# Patient Record
(demographics unavailable — no encounter records)

---

## 2024-10-25 NOTE — REASON FOR VISIT
[Follow-Up] : a follow-up visit  [Parent] : parent [FreeTextEntry3] : OLTx  [FreeTextEntry5] : 2/14/2024

## 2024-10-25 NOTE — PLAN
[FreeTextEntry1] : 47M PMHx HTN, essential thrombocytosis, transferred to [facility] from [facility] with decompensated EtOH cirrhosis for liver transplant evaluation. Underwent  donor orthotopic liver transplant on 24.   Readmitted 3/5-20- for VRE Bacteremia, bile leak s/p stent placement, perihepatic collection s/p drain placement removed on .   # GRAFT:  recent labs done in 2024 noted with elevated liver enzymes due to biliary stricture.  ERCP done on 2024 with Dr. Peters for post OLT biliary stricture. LFTs improved after procedure.  Had repeat ERCP in 10/22/2024 due to stent migration, now with 2 plastic stents placed.  Repeat labs in 1 week home draw.  Repeat ERCP in a few months   # Immuno: Fk Level 6.7 on tacro 0.5 BID (Mon, Wed, ), Tacro 0.5 QD (Tues, Thursday). Special dosing given low metabolism to FK vs biliary stricture.  mg BID. Patient is compliant with medications.  Will adjust tacro dosage based on repeat labs next week.   # PPX: Valcyte (high risk) noted low viremia will continue for now ok to DC if negative on next set of labs. Completed Bactrim for 6 mos, PPI. DC Keppra for seizure pphx in 2024.   #Thrombocytopenia: PLT 70--> possibly related to residual splenomegaly.   # CBD Stent: Continue ursodial. Last ERCP in 10/22/2024 due to stent migration, now with 2 plastic stents placed. Due for repeat ERCP with Dr. Peters in 3 months 2025.   #Left elbow pain-- most likely related to hx bone spur at the joint. He had films done with outside ortho in the past. Scheduled for outpatient procedure in 2 weeks spoke to ortho office to hold off on the procedure given recent elevated liver enzymes will evaluate this first to r/o biliary obstruction vs ACR.   #RPP - Never completed RPP intake. Continue to deny ETOH cravings. Understands that he cannot drink any alcohol to protect the liver. Refused medications to prevent cravings. Peth has been negative since OLT. Continue to PETH periodically. Social work to follow.   #Hypomagnesemia - Currently on supplemental Mg 800mg BID. Poor PO intake at baseline. Encouraged to incorporate foods high in magnesium to diet.   #General myalgia/neuropathy post transplant-- Cont gabapentin 300mg BID. cont PT weekly.  #essential tremor-- continue on propranolol 10mg twice daily. f/u with outside neurologist.   #Shx: currently unemployed. 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    #Health maintenance Colonoscopy--CF done in 2024 non bleeding hemorrhoids and rectal varices. no polyp. next due in 7-10 years.  Chest CT (smoking hx) --done in 3/2024 unremarkable.  DEXA--will obtain after one year of transplant.  Skin check--done with outside dermatologist Maria E Tatum, DO in 2024 next due in one year.  Vaccination-- Flu received in 2024 UTD  / COVID  received in 2024 UTD   / PCV will obtain in [facility] in 1 month [place]. Repeat Home draw lab in 1 weeks.

## 2024-10-25 NOTE — REVIEW OF SYSTEMS
[Abdominal Pain] : abdominal pain [Joint Pain] : joint pain [Negative] : Heme/Lymph [Fever] : no fever [Chills] : no chills [Fatigue] : no fatigue [Sclera anicteric] : sclera icteric [Double vision] : no double vision [Blurred Vision] : no blurred vision [Chest Pain] : no chest pain [Palpitations] : no palpitations [SOB] : no shortness of breath [Wheezing] : no wheezing [Cough] : no cough [Nausea] : no nausea [Constipation] : no constipation [Diarrhea] : diarrhea [Dysuria] : no dysuria [Frequency] : no frequency [Urgency] : no urinary urgency [Joint Stiffness] : no joint stiffness [Muscle Pain] : no muscle pain [FreeTextEntry9] : L elbow pain

## 2024-10-25 NOTE — PLAN
[FreeTextEntry1] : 47M PMHx HTN, essential thrombocytosis, transferred to [facility] from [facility] with decompensated EtOH cirrhosis for liver transplant evaluation. Underwent  donor orthotopic liver transplant on 24.   Readmitted 3/5-20- for VRE Bacteremia, bile leak s/p stent placement, perihepatic collection s/p drain placement removed on .   # GRAFT:  recent labs done in 2024 noted with elevated liver enzymes due to biliary stricture.  ERCP done on 2024 with Dr. Peters for post OLT biliary stricture. LFTs improved after procedure.  Had repeat ERCP in 10/22/2024 due to stent migration, now with 2 plastic stents placed.  Repeat labs in 1 week home draw.  Repeat ERCP in a few months   # Immuno: Fk Level 6.7 on tacro 0.5 BID (Mon, Wed, ), Tacro 0.5 QD (Tues, Thursday). Special dosing given low metabolism to FK vs biliary stricture.  mg BID. Patient is compliant with medications.  Will adjust tacro dosage based on repeat labs next week.   # PPX: Valcyte (high risk) noted low viremia will continue for now ok to DC if negative on next set of labs. Completed Bactrim for 6 mos, PPI. DC Keppra for seizure pphx in 2024.   #Thrombocytopenia: PLT 70--> possibly related to residual splenomegaly.   # CBD Stent: Continue ursodial. Last ERCP in 10/22/2024 due to stent migration, now with 2 plastic stents placed. Due for repeat ERCP with Dr. Peters in 3 months 2025.   #Left elbow pain-- most likely related to hx bone spur at the joint. He had films done with outside ortho in the past. Scheduled for outpatient procedure in 2 weeks spoke to ortho office to hold off on the procedure given recent elevated liver enzymes will evaluate this first to r/o biliary obstruction vs ACR.   #RPP - Never completed RPP intake. Continue to deny ETOH cravings. Understands that he cannot drink any alcohol to protect the liver. Refused medications to prevent cravings. Peth has been negative since OLT. Continue to PETH periodically. Social work to follow.   #Hypomagnesemia - Currently on supplemental Mg 800mg BID. Poor PO intake at baseline. Encouraged to incorporate foods high in magnesium to diet.   #General myalgia/neuropathy post transplant-- Cont gabapentin 300mg BID. cont PT weekly.  #essential tremor-- continue on propranolol 10mg twice daily. f/u with outside neurologist.   #Shx: currently unemployed. 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    #Health maintenance Colonoscopy--CF done in 2024 non bleeding hemorrhoids and rectal varices. no polyp. next due in 7-10 years.  Chest CT (smoking hx) --done in 3/2024 unremarkable.  DEXA--will obtain after one year of transplant.  Skin check--done with outside dermatologist Maria E Tatum, DO in 2024 next due in one year.  Vaccination-- Flu received in 2024 UTD  / COVID  received in 2024 UTD   / PCV will obtain in [facility] in 1 month [place]. Repeat Home draw lab in 1 weeks.

## 2024-10-25 NOTE — PHYSICAL EXAM
[Alert] : alert [No Acute Distress] : no acute distress [EOMI] : extra ocular movement intact [PERRLA] : pupils equal, round, reactive to light and accomodation [Full ROM] : full range of motion [No Lymphadenopathy] : no lymphadenopathy [Clear to Auscultation] : lungs were clear to auscultation bilaterally [Breathing Comfortably on room air] : breathing comfortably on room air [Normal Rate] : normal rate [Regular Rhythm] : regular rhythm [Soft] : soft [Normal Bowel Sounds] : normal bowel sounds [Clean] : clean [Dry] : dry [Healing Well] : healing well [No Edema] : no edema [No Skin Discoloration] : no skin discoloration [No Ulcers] : no ulcers [Strength in Tact] : strength in tact [No Rash] : no rash [Scleral Icterus] : no scleral icterus [Hepatojugular Reflux] : no hepatojugular reflux [Ascites Fluid Wave] : no ascites fluid wave [Abdominal Ascites] : no abdominal ascites [Ascites Tense] : no ascites tense [Bleeding] : no active bleeding [Foul Odor] : no foul smell [Purulent Drainage] : no purulent drainage [Serosanguinous Drainage] : no serosanguinous drainage [Erythema] : not erythematous [Warm] : not warm [Tender] : not tender [Spider Angioma] : no spider angioma [Jaundice] : no jaundice [Palmar Erythema] : no palmar erythema [Asterixis] : no asterixis [Hepatic Encephalopathy] : no hepatic encephalopathy [de-identified] : RLQ pain around the incision line. sensitive to touch. [de-identified] : dry/intact [FreeTextEntry1] : c/o tenderness Left elbow

## 2024-10-25 NOTE — HISTORY OF PRESENT ILLNESS
[Alcoholic Liver Disease] : Alcoholic Liver Disease [Liver] : Liver [Donor after brain death (DBD] : Donor after brain death (DBD) [Basiliximab] : Basiliximab [Steroids] : Steroids [Positive/Negative] : Positive/Negative [TextBox_52] : CHANTALOS ID: [id number] Match ID: [id number]  [FreeTextEntry1] : Adan Nagel is a 46 y/o M with past medical history significant for HTN, essential thrombocytosis and decompensated ETOH cirrhosis now s/p OLT 2/14/2024 with Simulect induction, uncomplicated post-operative course discharged home on 2/27/24,   Re-admitted  March 5-20 - presents to [facility] ED on 3/5/24 with fever, diarrhea, lethargy, found to be lethargic, hypotensive and febrile, concerning for septic shock.  Blood cultures from 3/5 & 3/7 grew vancomycin resistant Enterococcus faecium (VRE), blood cultures cleared since 3/9. - CT CAP noncon (3/5): perihepatic collection - CT AP (3/8): portacaval collection measuring 9.8 x 4.4 x 3.4 cm  - ERCP 3/8 with placement of covered metal stent, positive for bile leak.  - IR drainage of perihepatic collection with pigtail drain placement on 3/12, fluid culture positive for VRE and candida glabrata. (drain removed 4/29) - Treated with Caspofungin/Daptomycin/unasyn by transplant ID - PICC line placed on 3/18   EXPLANT Pathology:  - Established cirrhosis with steatohepatitis  - Gallbladder with minimal chronic inflammation and cholelithiasis - Two benign lymph nodes with hemosiderin - The overall findings are consistent with steatohepatitis with cirrhosis.  Interval Hx LOV 9/12/2024 8 months post-transplant.  Noted with elevated LFTs bili 1.2 AST 51 ALT 45 ALKP 152 in 9/16 2024.  Had two ERCPs done on 9/26/2024 with Dr. Peters for post OLT biliary stricture,. Had repeat ERCP in 10/22/2024 due to stent migration, now with 2 plastic stents placed. Right sided abd pain improved after the repeat ERCP.  Graft function remains stable with liver enzymes improving and mildly elevated Total bili at 1.4. Due for repeat labs after last ERCP.  Dx with chronic pancreatic insufficiency 2/2 alcohol use started on creon in 10/15/2024  f/u outside GI Dr. ORA NOLASCO.  Reports loose bowel movement has improved since started creon.  Has been compliant with the immunosuppressants.  Continue physical therapy near home. helps with torn left knee meniscus. Will continue f/u with ortho.  c/o RLQ pain along incision - retained suture in the stomach.  RPP-- have not complete intake with [facility] in [place]. but Peth has been negative.   Current Immuno:  Tacro level 6.7 (10/2024) on tacro 0.5 BID (Mon, Wed, Friday Sunday), Tacro 0.5 QD (Tues, Thursday).   MMF 500mg BID  PPX: Valcyte, Bactrim, PPI, Mag

## 2024-10-25 NOTE — HISTORY OF PRESENT ILLNESS
[Alcoholic Liver Disease] : Alcoholic Liver Disease [Liver] : Liver [Donor after brain death (DBD] : Donor after brain death (DBD) [Basiliximab] : Basiliximab [Steroids] : Steroids [Positive/Negative] : Positive/Negative [TextBox_52] : CHANTALOS ID: [id number] Match ID: [id number]  [FreeTextEntry1] : Adan Nagel is a 48 y/o M with past medical history significant for HTN, essential thrombocytosis and decompensated ETOH cirrhosis now s/p OLT 2/14/2024 with Simulect induction, uncomplicated post-operative course discharged home on 2/27/24,   Re-admitted  March 5-20 - presents to [facility] ED on 3/5/24 with fever, diarrhea, lethargy, found to be lethargic, hypotensive and febrile, concerning for septic shock.  Blood cultures from 3/5 & 3/7 grew vancomycin resistant Enterococcus faecium (VRE), blood cultures cleared since 3/9. - CT CAP noncon (3/5): perihepatic collection - CT AP (3/8): portacaval collection measuring 9.8 x 4.4 x 3.4 cm  - ERCP 3/8 with placement of covered metal stent, positive for bile leak.  - IR drainage of perihepatic collection with pigtail drain placement on 3/12, fluid culture positive for VRE and candida glabrata. (drain removed 4/29) - Treated with Caspofungin/Daptomycin/unasyn by transplant ID - PICC line placed on 3/18   EXPLANT Pathology:  - Established cirrhosis with steatohepatitis  - Gallbladder with minimal chronic inflammation and cholelithiasis - Two benign lymph nodes with hemosiderin - The overall findings are consistent with steatohepatitis with cirrhosis.  Interval Hx LOV 9/12/2024 8 months post-transplant.  Noted with elevated LFTs bili 1.2 AST 51 ALT 45 ALKP 152 in 9/16 2024.  Had two ERCPs done on 9/26/2024 with Dr. Peters for post OLT biliary stricture,. Had repeat ERCP in 10/22/2024 due to stent migration, now with 2 plastic stents placed. Right sided abd pain improved after the repeat ERCP.  Graft function remains stable with liver enzymes improving and mildly elevated Total bili at 1.4. Due for repeat labs after last ERCP.  Dx with chronic pancreatic insufficiency 2/2 alcohol use started on creon in 10/15/2024  f/u outside GI Dr. ORA NOLASCO.  Reports loose bowel movement has improved since started creon.  Has been compliant with the immunosuppressants.  Continue physical therapy near home. helps with torn left knee meniscus. Will continue f/u with ortho.  c/o RLQ pain along incision - retained suture in the stomach.  RPP-- have not complete intake with [employer] in [place]. but Peth has been negative.   Current Immuno:  Tacro level 6.7 (10/2024) on tacro 0.5 BID (Mon, Wed, Friday Sunday), Tacro 0.5 QD (Tues, Thursday).   MMF 500mg BID  PPX: Valcyte, Bactrim, PPI, Mag

## 2024-10-30 NOTE — PHYSICAL EXAM
[Alert] : alert [No Acute Distress] : no acute distress [EOMI] : extra ocular movement intact [PERRLA] : pupils equal, round, reactive to light and accomodation [Full ROM] : full range of motion [No Lymphadenopathy] : no lymphadenopathy [Clear to Auscultation] : lungs were clear to auscultation bilaterally [Breathing Comfortably on room air] : breathing comfortably on room air [Normal Rate] : normal rate [Regular Rhythm] : regular rhythm [Soft] : soft [Normal Bowel Sounds] : normal bowel sounds [Clean] : clean [Dry] : dry [Healing Well] : healing well [No Edema] : no edema [No Skin Discoloration] : no skin discoloration [No Ulcers] : no ulcers [Strength in Tact] : strength in tact [No Rash] : no rash [Scleral Icterus] : no scleral icterus [Hepatojugular Reflux] : no hepatojugular reflux [Ascites Fluid Wave] : no ascites fluid wave [Abdominal Ascites] : no abdominal ascites [Ascites Tense] : no ascites tense [Bleeding] : no active bleeding [Foul Odor] : no foul smell [Purulent Drainage] : no purulent drainage [Serosanguinous Drainage] : no serosanguinous drainage [Erythema] : not erythematous [Warm] : not warm [Tender] : not tender [Spider Angioma] : no spider angioma [Jaundice] : no jaundice [Palmar Erythema] : no palmar erythema [Asterixis] : no asterixis [Hepatic Encephalopathy] : no hepatic encephalopathy [de-identified] : RLQ pain around the incision line. sensitive to touch. [de-identified] : dry/intact [FreeTextEntry1] : c/o tenderness Left elbow

## 2024-10-30 NOTE — PLAN
[FreeTextEntry1] : 47M PMHx HTN, essential thrombocytosis, transferred to [facility] from [facility] with decompensated EtOH cirrhosis for liver transplant evaluation. Underwent  donor orthotopic liver transplant on 24.   Readmitted 3/5-20- for VRE Bacteremia, bile leak s/p stent placement, perihepatic collection s/p drain placement removed on .   # GRAFT: recent labs done in 2024 noted with elevated liver enzymes AST 50 ALT 50 ALKP 141 TB 1.3. creatinine 0.79 Possibly related to biliary issues with elevated Tbili and given recent biliary stent removal.  Will need urgent MRCP and possibly ERCP for further evualation.  Consider liver biopsy if negative MRCP or persisting elevated liver enzymes.  stable H/H. PLT 70--> possibly related to residual splenomegaly.  Repeat labs in 1 week.  Discussed if noted alarming sx such as fevers, nausea, vomiting, jaundice, abdominal pain, itchiness need to go to [facility] ED ASAP.   # Immuno: Fk Level 6.9 on tacro 0.5 BID (Mon, Wed, ), Tacro 0.5 QD (Tues, Thursday). Special dosing given low metabolism to FK.  mg BID. Patient is compliant with medications.   # PPX: Valcyte (high risk) noted low viremia will continue for now, Bactrim (Okay to stop at 6 months tom), PPI. Stop Keppra for seizure pphx today (no hx of seizures).   # CBD Stent  ERCP w/ stent placement done during hospitalization 3/8, Repeat ERCP done on 2024 then in 2024 with stent removal by Dr. Rocha. No stents in place. continue on ursodial.   #Left elbow pain-- most likely related to hx bone spur at the joint. He had films done with outside ortho in the past. Scheduled for outpatient procedure in 2 weeks spoke to ortho office to hold off on the procedure given recent elevated liver enzymes will evaluate this first to r/o biliary obstruction vs ACR.   #RPP - patient states that he might have had the intake done with [facility] few months ago at [facility]. No further visits done. Continue to deny ETOH cravings. Understands that he cannot drink any alcohol to protect the liver. Discussed to reconnect with [facility] at [place] location today after the visit. Would try to have patient on PO Naltrexone if patient willing. Continue to PETH periodically. Social work to follow.   #Hypomagnesemia - Currently on supplemental Mg 800mg BID. Poor PO intake at baseline. Encouraged to incorporate foods high in magnesium to diet.   #Leukopenia - improved.   #General myalgia/neuropathy post transplant-- Cont gabapentin 300mg BID. cont PT weekly.  #essential tremor-- continue on propranolol 10mg twice daily. f/u with outside neurologist.    #Health maintenance EGD-- Colonoscopy-- DEXA-- Skin check--2024 with Maria E Tatum, DO Vaccination-- Flu Duane Reade UTD / COVID UTD / PCV 20 need with pharmacy.   RPA in 6 weeks [place]. Repeat Home draw lab in 1 weeks.

## 2024-10-30 NOTE — HISTORY OF PRESENT ILLNESS
[Alcoholic Liver Disease] : Alcoholic Liver Disease [Liver] : Liver [Donor after brain death (DBD] : Donor after brain death (DBD) [Basiliximab] : Basiliximab [Steroids] : Steroids [Positive/Negative] : Positive/Negative [TextBox_52] : CHANTALOS ID: [id number] Match ID: [id number]  [FreeTextEntry1] : Adan Nagel is a 46 y/o M with past medical history significant for HTN, essential thrombocytosis and decompensated ETOH cirrhosis now s/p OLT 2/14/2024 with Simulect induction, uncomplicated post-operative course discharged home on 2/27/24,   Re-admitted  March 5-20 - presents to [facility] ED on 3/5/24 with fever, diarrhea, lethargy, found to be lethargic, hypotensive and febrile, concerning for septic shock.  Blood cultures from 3/5 & 3/7 grew vancomycin resistant Enterococcus faecium (VRE), blood cultures cleared since 3/9. - CT CAP noncon (3/5): perihepatic collection - CT AP (3/8): portacaval collection measuring 9.8 x 4.4 x 3.4 cm  - ERCP 3/8 with placement of covered metal stent, positive for bile leak.  - IR drainage of perihepatic collection with pigtail drain placement on 3/12, fluid culture positive for VRE and candida glabrata. (drain removed 4/29) - Treated with Caspofungin/Daptomycin/unasyn by transplant ID - PICC line placed on 3/18   EXPLANT Pathology:  - Established cirrhosis with steatohepatitis  - Gallbladder with minimal chronic inflammation and cholelithiasis - Two benign lymph nodes with hemosiderin - The overall findings are consistent with steatohepatitis with cirrhosis.  Interval Hx LOV 6/13/2024 7 months post-transplant.  Had ERCP done with Dr. Rocha on 7/29/2024 metal biliary stents had spontaneously migrated out of the bile duct, anastamotic biliary stricture improved. No stents replaced.   Had COVID infection two weeks ago. Mild sx with coughing no fevers. did not take Plaxlovid. sx resolved now.  reviewed most recent labs done on 9/2024 noted elevated liver enzymes ALT50 / AST 50 // TB 1.3 Normal renal function. Mg still slightly lower on high dose magnesium.  Has been compliant with the immunosuppressants did missed a doses a week ago.   Continue physical therapy near home. doing well. helps with the myalgia.  c/o RLQ pain along incision - retained suture in the stomach.  RPP-- have not complete intake with [employer] in [place]. but Peth has been negative.  Patient is planned for left elbow bone spur removal with orthopedic in 2 weeks.  Torn left meniscus a week ago unclear how but noted swollen left knee with bruises before PT had MRI done. Was told to be mild tear currently bracing and may not need any invasive treatment.   Current Immuno:  Tacro level 6.9 (7/26/2024) on tacro 0.5 BID (Mon, Wed, Friday Sunday), Tacro 0.5 QD (Tues, Thursday).   MMF 500mg BID   PPX: Valcyte, Bactrim, PPI, Mag  ERCP done in 10/22/2024 Impression: - Interval migration of previously placed [id number] FCSEMS, not visualized. - Anastomotic stricture again seen, dilated to 6mm and two plastic [id number] straight stents placed. Recommendation: - Repeat ERCP for stent exchange (will try 3 stents) in 3 months  Coughing 1.5 week productive only occurs sporadically.  Taking creon 3 times a day with or without meals.

## 2024-10-30 NOTE — PHYSICAL EXAM
[Alert] : alert [No Acute Distress] : no acute distress [EOMI] : extra ocular movement intact [PERRLA] : pupils equal, round, reactive to light and accomodation [Full ROM] : full range of motion [No Lymphadenopathy] : no lymphadenopathy [Clear to Auscultation] : lungs were clear to auscultation bilaterally [Breathing Comfortably on room air] : breathing comfortably on room air [Normal Rate] : normal rate [Regular Rhythm] : regular rhythm [Soft] : soft [Normal Bowel Sounds] : normal bowel sounds [Clean] : clean [Dry] : dry [Healing Well] : healing well [No Edema] : no edema [No Skin Discoloration] : no skin discoloration [No Ulcers] : no ulcers [Strength in Tact] : strength in tact [No Rash] : no rash [Scleral Icterus] : no scleral icterus [Hepatojugular Reflux] : no hepatojugular reflux [Ascites Fluid Wave] : no ascites fluid wave [Abdominal Ascites] : no abdominal ascites [Ascites Tense] : no ascites tense [Bleeding] : no active bleeding [Foul Odor] : no foul smell [Purulent Drainage] : no purulent drainage [Serosanguinous Drainage] : no serosanguinous drainage [Erythema] : not erythematous [Warm] : not warm [Tender] : not tender [Spider Angioma] : no spider angioma [Jaundice] : no jaundice [Palmar Erythema] : no palmar erythema [Asterixis] : no asterixis [Hepatic Encephalopathy] : no hepatic encephalopathy [de-identified] : RLQ pain around the incision line. sensitive to touch. [de-identified] : dry/intact [FreeTextEntry1] : c/o tenderness Left elbow

## 2024-10-30 NOTE — HISTORY OF PRESENT ILLNESS
[Alcoholic Liver Disease] : Alcoholic Liver Disease [Liver] : Liver [Donor after brain death (DBD] : Donor after brain death (DBD) [Basiliximab] : Basiliximab [Steroids] : Steroids [Positive/Negative] : Positive/Negative [TextBox_52] : CHANTALOS ID: [id number] Match ID: [id number]  [FreeTextEntry1] : Adan Nagel is a 46 y/o M with past medical history significant for HTN, essential thrombocytosis and decompensated ETOH cirrhosis now s/p OLT 2/14/2024 with Simulect induction, uncomplicated post-operative course discharged home on 2/27/24,   Re-admitted  March 5-20 - presents to [facility] ED on 3/5/24 with fever, diarrhea, lethargy, found to be lethargic, hypotensive and febrile, concerning for septic shock.  Blood cultures from 3/5 & 3/7 grew vancomycin resistant Enterococcus faecium (VRE), blood cultures cleared since 3/9. - CT CAP noncon (3/5): perihepatic collection - CT AP (3/8): portacaval collection measuring 9.8 x 4.4 x 3.4 cm  - ERCP 3/8 with placement of covered metal stent, positive for bile leak.  - IR drainage of perihepatic collection with pigtail drain placement on 3/12, fluid culture positive for VRE and candida glabrata. (drain removed 4/29) - Treated with Caspofungin/Daptomycin/unasyn by transplant ID - PICC line placed on 3/18   EXPLANT Pathology:  - Established cirrhosis with steatohepatitis  - Gallbladder with minimal chronic inflammation and cholelithiasis - Two benign lymph nodes with hemosiderin - The overall findings are consistent with steatohepatitis with cirrhosis.  Interval Hx LOV 6/13/2024 7 months post-transplant.  Had ERCP done with Dr. Rocha on 7/29/2024 metal biliary stents had spontaneously migrated out of the bile duct, anastamotic biliary stricture improved. No stents replaced.   Had COVID infection two weeks ago. Mild sx with coughing no fevers. did not take Plaxlovid. sx resolved now.  reviewed most recent labs done on 9/2024 noted elevated liver enzymes ALT50 / AST 50 // TB 1.3 Normal renal function. Mg still slightly lower on high dose magnesium.  Has been compliant with the immunosuppressants did missed a doses a week ago.   Continue physical therapy near home. doing well. helps with the myalgia.  c/o RLQ pain along incision - retained suture in the stomach.  RPP-- have not complete intake with [facility] [facility] in [place]. but Peth has been negative.  Patient is planned for left elbow bone spur removal with orthopedic in 2 weeks.  Torn left meniscus a week ago unclear how but noted swollen left knee with bruises before PT had MRI done. Was told to be mild tear currently bracing and may not need any invasive treatment.   Current Immuno:  Tacro level 6.9 (7/26/2024) on tacro 0.5 BID (Mon, Wed, Friday Sunday), Tacro 0.5 QD (Tues, Thursday).   MMF 500mg BID   PPX: Valcyte, Bactrim, PPI, Mag  ERCP done in 10/22/2024 Impression: - Interval migration of previously placed [id number] FCSEMS, not visualized. - Anastomotic stricture again seen, dilated to 6mm and two plastic [id number] straight stents placed. Recommendation: - Repeat ERCP for stent exchange (will try 3 stents) in 3 months  Coughing 1.5 week productive only occurs sporadically.  Taking creon 3 times a day with or without meals.

## 2024-11-22 NOTE — REVIEW OF SYSTEMS
[Fever] : no fever [Chills] : no chills [Fatigue] : no fatigue [Sclera anicteric] : sclera icteric [Double vision] : no double vision [Blurred Vision] : no blurred vision [Chest Pain] : no chest pain [Palpitations] : no palpitations [SOB] : no shortness of breath [Wheezing] : no wheezing [Cough] : no cough [Nausea] : no nausea [Constipation] : no constipation [Diarrhea] : diarrhea [Dysuria] : no dysuria [Frequency] : no frequency [Urgency] : no urinary urgency [Joint Stiffness] : no joint stiffness [Muscle Pain] : no muscle pain [FreeTextEntry9] : L elbow pain

## 2024-11-22 NOTE — PHYSICAL EXAM
[Scleral Icterus] : no scleral icterus [Hepatojugular Reflux] : no hepatojugular reflux [Ascites Fluid Wave] : no ascites fluid wave [Ascites Tense] : no ascites tense [Abdominal Ascites] : no abdominal ascites [Bleeding] : no active bleeding [Foul Odor] : no foul smell [Purulent Drainage] : no purulent drainage [Serosanguinous Drainage] : no serosanguinous drainage [Erythema] : not erythematous [Warm] : not warm [Tender] : not tender [Spider Angioma] : no spider angioma [Jaundice] : no jaundice [Palmar Erythema] : no palmar erythema [Asterixis] : no asterixis [Hepatic Encephalopathy] : no hepatic encephalopathy [de-identified] : dry/intact [de-identified] : RLQ pain around the incision line. sensitive to touch. [FreeTextEntry1] : c/o tenderness Left elbow

## 2024-11-22 NOTE — PLAN
[FreeTextEntry1] : 47M PMHx HTN, essential thrombocytosis, transferred to [facility] from [facility] with decompensated EtOH cirrhosis for liver transplant evaluation. Underwent  donor orthotopic liver transplant on 24.   Readmitted 3/5-20- for VRE Bacteremia, bile leak s/p stent placement, perihepatic collection s/p drain placement removed on .   # GRAFT:  recent labs done in 2024 noted with elevated liver enzymes due to biliary stricture.  ERCP done on 2024 with Dr. Peters for post OLT biliary stricture. LFTs improved after procedure.  Had repeat ERCP in 10/22/2024 due to stent migration, now with 2 plastic stents placed.  Labs improved post ERCP TB down to 1.0 liver enzymes wnl.  Repeat ERCP scheduled for 2024.  # Immuno: Fk Level 6.0 on tacro 0.5 BID (Mon, Wed, ), Tacro 0.5 QD (Tues, Thursday). Special dosing given low metabolism to FK vs biliary stricture. Myfortic 360 BID. Patient is compliant with medications.    # PPX: Valcyte (high risk) noted low viremia . Will DC Valcyte today. continue CMV PCR check monthly. ( Completed Bactrim for 6 mos, PPI. DC Keppra for seizure pphx in 2024).   #Thrombocytopenia: PLT 70-->  residual splenomegaly.   # CBD Stent: Continue ursodial. Last ERCP in 10/22/2024 due to stent migration, now with 2 plastic stents placed. Due for repeat ERCP with Dr. Peters in 3 months 2025.   #Left elbow pain-- most likely related to hx bone spur at the joint. He had films done with outside ortho in the past. Scheduled for outpatient procedure in 2 weeks spoke to ortho office to hold off on the procedure. Will defer any elective procedure until post OLT one year.   #RPP - Never completed RPP intake. Continue to deny ETOH cravings. Understands that he cannot drink any alcohol to protect the liver. Refused medications to prevent cravings. Peth has been negative since OLT. Continue to PETH periodically. Social work to follow.   #Hypomagnesemia - Currently on supplemental Mg 800mg BID. Poor PO intake at baseline. Encouraged to incorporate foods high in magnesium to diet.   #General myalgia/neuropathy post transplant-- Cont gabapentin 300mg BID. cont PT weekly.  #essential tremor-- continue on propranolol 10mg twice daily. f/u with outside neurologist.   #Shx: currently unemployed. actively applying for jobs as an  or IT. Discussed to wear abd binder during physically demanding work.   #Health maintenance Colonoscopy--CF done in 2024 non bleeding hemorrhoids and rectal varices. no polyp. next due in 7-10 years.  Chest CT (smoking hx) --done in 3/2024 unremarkable.  DEXA--will obtain after one year of transplant.  Skin check--done with outside dermatologist Maria E Tatum, DO in 2024 next due in one year.  Vaccination-- Flu received in 2024 UTD  / COVID  received in 2024 UTD   / PCV will obtain in [facility] in 2 month [place]. Repeat Home draw lab in 1 month.

## 2024-11-22 NOTE — PHYSICAL EXAM
[Scleral Icterus] : no scleral icterus [Hepatojugular Reflux] : no hepatojugular reflux [Ascites Fluid Wave] : no ascites fluid wave [Abdominal Ascites] : no abdominal ascites [Ascites Tense] : no ascites tense [Bleeding] : no active bleeding [Foul Odor] : no foul smell [Purulent Drainage] : no purulent drainage [Serosanguinous Drainage] : no serosanguinous drainage [Erythema] : not erythematous [Warm] : not warm [Tender] : not tender [Spider Angioma] : no spider angioma [Jaundice] : no jaundice [Palmar Erythema] : no palmar erythema [Asterixis] : no asterixis [Hepatic Encephalopathy] : no hepatic encephalopathy [de-identified] : RLQ pain around the incision line. sensitive to touch. [de-identified] : dry/intact [FreeTextEntry1] : c/o tenderness Left elbow

## 2024-11-22 NOTE — HISTORY OF PRESENT ILLNESS
[TextBox_52] : CHANTALOS ID: [id number] Match ID: [id number]  [FreeTextEntry1] : Adan Nagel is a 48 y/o M with past medical history significant for HTN, essential thrombocytosis and decompensated ETOH cirrhosis now s/p OLT 2/14/2024 with Simulect induction, uncomplicated post-operative course discharged home on 2/27/24,   Re-admitted  March 5-20 - presents to [facility] ED on 3/5/24 with fever, diarrhea, lethargy, found to be lethargic, hypotensive and febrile, concerning for septic shock.  Blood cultures from 3/5 & 3/7 grew vancomycin resistant Enterococcus faecium (VRE), blood cultures cleared since 3/9. - CT CAP noncon (3/5): perihepatic collection - CT AP (3/8): portacaval collection measuring 9.8 x 4.4 x 3.4 cm  - ERCP 3/8 with placement of covered metal stent, positive for bile leak.  - IR drainage of perihepatic collection with pigtail drain placement on 3/12, fluid culture positive for VRE and candida glabrata. (drain removed 4/29) - Treated with Caspofungin/Daptomycin/unasyn by transplant ID - PICC line placed on 3/18   EXPLANT Pathology:  - Established cirrhosis with steatohepatitis  - Gallbladder with minimal chronic inflammation and cholelithiasis - Two benign lymph nodes with hemosiderin - The overall findings are consistent with steatohepatitis with cirrhosis.  9/17/2024 Noted with elevated LFTs bili 1.2 AST 51 ALT 45 ALKP 152 Had two ERCPs done on 9/26/2024 with Dr. Peters for post OLT biliary stricture,. Had repeat ERCP in 10/22/2024 due to stent migration, now with 2 plastic stents placed. Right sided abd pain improved after the repeat ERCP.   Interval Hx LOV 10/24/2024 9 months post-transplant.  Doing well. Reviewed most recent labs LFTs improving. TB down to 1.0.  Still having on and off nausea and vomiting with diarrhea. Seen GI Dr. Zendejas started on cholestyramine and stopped creaon. So far sx has been in control for the past 3 days. Has been compliant with the immunosuppressants.  Continue physical therapy near home. helps with torn left knee meniscus.  c/o RLQ pain along incision - retained suture in the stomach.  RPP-- have not complete intake with [facility] in [place]. but Peth has been negative.   Current Immuno:  Tacro level 6.0 (10/2024) on tacro 0.5 BID (Mon, Wed, Friday Sunday), Tacro 0.5 QD (Tues, Thursday).   Mycophenolate 360mg BID  PPX: Valcyte, Bactrim, PPI, Mag

## 2024-11-22 NOTE — HISTORY OF PRESENT ILLNESS
[TextBox_52] : CHANTALOS ID: [id number] Match ID: [id number]  [FreeTextEntry1] : Adan Nagel is a 48 y/o M with past medical history significant for HTN, essential thrombocytosis and decompensated ETOH cirrhosis now s/p OLT 2/14/2024 with Simulect induction, uncomplicated post-operative course discharged home on 2/27/24,   Re-admitted  March 5-20 - presents to [facility] ED on 3/5/24 with fever, diarrhea, lethargy, found to be lethargic, hypotensive and febrile, concerning for septic shock.  Blood cultures from 3/5 & 3/7 grew vancomycin resistant Enterococcus faecium (VRE), blood cultures cleared since 3/9. - CT CAP noncon (3/5): perihepatic collection - CT AP (3/8): portacaval collection measuring 9.8 x 4.4 x 3.4 cm  - ERCP 3/8 with placement of covered metal stent, positive for bile leak.  - IR drainage of perihepatic collection with pigtail drain placement on 3/12, fluid culture positive for VRE and candida glabrata. (drain removed 4/29) - Treated with Caspofungin/Daptomycin/unasyn by transplant ID - PICC line placed on 3/18   EXPLANT Pathology:  - Established cirrhosis with steatohepatitis  - Gallbladder with minimal chronic inflammation and cholelithiasis - Two benign lymph nodes with hemosiderin - The overall findings are consistent with steatohepatitis with cirrhosis.  9/17/2024 Noted with elevated LFTs bili 1.2 AST 51 ALT 45 ALKP 152 Had two ERCPs done on 9/26/2024 with Dr. Peters for post OLT biliary stricture,. Had repeat ERCP in 10/22/2024 due to stent migration, now with 2 plastic stents placed. Right sided abd pain improved after the repeat ERCP.   Interval Hx LOV 10/24/2024 9 months post-transplant.  Doing well. Reviewed most recent labs LFTs improving. TB down to 1.0.  Still having on and off nausea and vomiting with diarrhea. Seen GI Dr. Zendejas started on cholestyramine and stopped creaon. So far sx has been in control for the past 3 days. Has been compliant with the immunosuppressants.  Continue physical therapy near home. helps with torn left knee meniscus.  c/o RLQ pain along incision - retained suture in the stomach.  RPP-- have not complete intake with [facility] in [place]. but Peth has been negative.   Current Immuno:  Tacro level 6.0 (10/2024) on tacro 0.5 BID (Mon, Wed, Friday Sunday), Tacro 0.5 QD (Tues, Thursday).   Mycophenolate 360mg BID  PPX: Valcyte, Bactrim, PPI, Mag

## 2024-11-22 NOTE — PLAN
[FreeTextEntry1] : 47M PMHx HTN, essential thrombocytosis, transferred to [facility] from [facility] with decompensated EtOH cirrhosis for liver transplant evaluation. Underwent  donor orthotopic liver transplant on 24.   Readmitted 3/5-20- for VRE Bacteremia, bile leak s/p stent placement, perihepatic collection s/p drain placement removed on .   # GRAFT:  recent labs done in 2024 noted with elevated liver enzymes due to biliary stricture.  ERCP done on 2024 with Dr. Peters for post OLT biliary stricture. LFTs improved after procedure.  Had repeat ERCP in 10/22/2024 due to stent migration, now with 2 plastic stents placed.  Labs improved post ERCP TB down to 1.0 liver enzymes wnl.  Repeat ERCP scheduled for 2024.  # Immuno: Fk Level 6.0 on tacro 0.5 BID (Mon, Wed, ), Tacro 0.5 QD (Tues, Thursday). Special dosing given low metabolism to FK vs biliary stricture. Myfortic 360 BID. Patient is compliant with medications.    # PPX: Valcyte (high risk) noted low viremia . Will DC Valcyte today. continue CMV PCR check monthly. ( Completed Bactrim for 6 mos, PPI. DC Keppra for seizure pphx in 2024).   #Thrombocytopenia: PLT 70-->  residual splenomegaly.   # CBD Stent: Continue ursodial. Last ERCP in 10/22/2024 due to stent migration, now with 2 plastic stents placed. Due for repeat ERCP with Dr. Peters in 3 months 2025.   #Left elbow pain-- most likely related to hx bone spur at the joint. He had films done with outside ortho in the past. Scheduled for outpatient procedure in 2 weeks spoke to ortho office to hold off on the procedure. Will defer any elective procedure until post OLT one year.   #RPP - Never completed RPP intake. Continue to deny ETOH cravings. Understands that he cannot drink any alcohol to protect the liver. Refused medications to prevent cravings. Peth has been negative since OLT. Continue to PETH periodically. Social work to follow.   #Hypomagnesemia - Currently on supplemental Mg 800mg BID. Poor PO intake at baseline. Encouraged to incorporate foods high in magnesium to diet.   #General myalgia/neuropathy post transplant-- Cont gabapentin 300mg BID. cont PT weekly.  #essential tremor-- continue on propranolol 10mg twice daily. f/u with outside neurologist.   #Shx: currently unemployed. actively applying for jobs as an  or IT. Discussed to wear abd binder during physically demanding work.   #Health maintenance Colonoscopy--CF done in 2024 non bleeding hemorrhoids and rectal varices. no polyp. next due in 7-10 years.  Chest CT (smoking hx) --done in 3/2024 unremarkable.  DEXA--will obtain after one year of transplant.  Skin check--done with outside dermatologist Maria E Tatum, DO in 2024 next due in one year.  Vaccination-- Flu received in 2024 UTD  / COVID  received in 2024 UTD   / PCV will obtain in [facility] in 2 month [place]. Repeat Home draw lab in 1 month.

## 2024-11-22 NOTE — REVIEW OF SYSTEMS
[Fever] : no fever [Chills] : no chills [Fatigue] : no fatigue [Sclera anicteric] : sclera icteric [Double vision] : no double vision [Chest Pain] : no chest pain [Blurred Vision] : no blurred vision [Palpitations] : no palpitations [SOB] : no shortness of breath [Wheezing] : no wheezing [Cough] : no cough [Nausea] : no nausea [Constipation] : no constipation [Diarrhea] : diarrhea [Dysuria] : no dysuria [Frequency] : no frequency [Urgency] : no urinary urgency [Joint Stiffness] : no joint stiffness [Muscle Pain] : no muscle pain [FreeTextEntry9] : L elbow pain

## 2024-11-22 NOTE — HISTORY OF PRESENT ILLNESS
[TextBox_52] : CHANTALOS ID: [id number] Match ID: [id number]  [FreeTextEntry1] : Adan Nagel is a 48 y/o M with past medical history significant for HTN, essential thrombocytosis and decompensated ETOH cirrhosis now s/p OLT 2/14/2024 with Simulect induction, uncomplicated post-operative course discharged home on 2/27/24,   Re-admitted  March 5-20 - presents to [facility] ED on 3/5/24 with fever, diarrhea, lethargy, found to be lethargic, hypotensive and febrile, concerning for septic shock.  Blood cultures from 3/5 & 3/7 grew vancomycin resistant Enterococcus faecium (VRE), blood cultures cleared since 3/9. - CT CAP noncon (3/5): perihepatic collection - CT AP (3/8): portacaval collection measuring 9.8 x 4.4 x 3.4 cm  - ERCP 3/8 with placement of covered metal stent, positive for bile leak.  - IR drainage of perihepatic collection with pigtail drain placement on 3/12, fluid culture positive for VRE and candida glabrata. (drain removed 4/29) - Treated with Caspofungin/Daptomycin/unasyn by transplant ID - PICC line placed on 3/18   EXPLANT Pathology:  - Established cirrhosis with steatohepatitis  - Gallbladder with minimal chronic inflammation and cholelithiasis - Two benign lymph nodes with hemosiderin - The overall findings are consistent with steatohepatitis with cirrhosis.  9/17/2024 Noted with elevated LFTs bili 1.2 AST 51 ALT 45 ALKP 152 Had two ERCPs done on 9/26/2024 with Dr. Peters for post OLT biliary stricture,. Had repeat ERCP in 10/22/2024 due to stent migration, now with 2 plastic stents placed. Right sided abd pain improved after the repeat ERCP.   Interval Hx LOV 10/24/2024 9 months post-transplant.  Doing well. Reviewed most recent labs LFTs improving. TB down to 1.0.  Still having on and off nausea and vomiting with diarrhea. Seen GI Dr. Zendejas started on cholestyramine and stopped creaon. So far sx has been in control for the past 3 days. Has been compliant with the immunosuppressants.  Continue physical therapy near home. helps with torn left knee meniscus.  c/o RLQ pain along incision - retained suture in the stomach.  RPP-- have not complete intake with [facility] in [place]. but Peth has been negative.   Current Immuno:  Tacro level 6.0 (10/2024) on tacro 0.5 BID (Mon, Wed, Friday Sunday), Tacro 0.5 QD (Tues, Thursday).   Mycophenolate 360mg BID  PPX: Valcyte, Bactrim, PPI, Mag

## 2024-11-22 NOTE — PLAN
[FreeTextEntry1] : 47M PMHx HTN, essential thrombocytosis, transferred to [facility] from [facility] with decompensated EtOH cirrhosis for liver transplant evaluation. Underwent  donor orthotopic liver transplant on 24.   Readmitted 3/5-20- for VRE Bacteremia, bile leak s/p stent placement, perihepatic collection s/p drain placement removed on .   # GRAFT:  recent labs done in 2024 noted with elevated liver enzymes due to biliary stricture.  ERCP done on 2024 with Dr. Peters for post OLT biliary stricture. LFTs improved after procedure.  Had repeat ERCP in 10/22/2024 due to stent migration, now with 2 plastic stents placed.  Labs improved post ERCP TB down to 1.0 liver enzymes wnl.  Repeat ERCP scheduled for 2024.  # Immuno: Fk Level 6.0 on tacro 0.5 BID (Mon, Wed, ), Tacro 0.5 QD (Tues, Thursday). Special dosing given low metabolism to FK vs biliary stricture. Myfortic 360 BID. Patient is compliant with medications.    # PPX: Valcyte (high risk) noted low viremia . Will DC Valcyte today. continue CMV PCR check monthly. ( Completed Bactrim for 6 mos, PPI. DC Keppra for seizure pphx in 2024).   #Thrombocytopenia: PLT 70-->  residual splenomegaly.   # CBD Stent: Continue ursodial. Last ERCP in 10/22/2024 due to stent migration, now with 2 plastic stents placed. Due for repeat ERCP with Dr. Peters in 3 months 2025.   #Left elbow pain-- most likely related to hx bone spur at the joint. He had films done with outside ortho in the past. Scheduled for outpatient procedure in 2 weeks spoke to ortho office to hold off on the procedure. Will defer any elective procedure until post OLT one year.   #RPP - Never completed RPP intake. Continue to deny ETOH cravings. Understands that he cannot drink any alcohol to protect the liver. Refused medications to prevent cravings. Peth has been negative since OLT. Continue to PETH periodically. Social work to follow.   #Hypomagnesemia - Currently on supplemental Mg 800mg BID. Poor PO intake at baseline. Encouraged to incorporate foods high in magnesium to diet.   #General myalgia/neuropathy post transplant-- Cont gabapentin 300mg BID. cont PT weekly.  #essential tremor-- continue on propranolol 10mg twice daily. f/u with outside neurologist.   #Shx: currently unemployed. actively applying for jobs as an  or IT. Discussed to wear abd binder during physically demanding work.   #Health maintenance Colonoscopy--CF done in 2024 non bleeding hemorrhoids and rectal varices. no polyp. next due in 7-10 years.  Chest CT (smoking hx) --done in 3/2024 unremarkable.  DEXA--will obtain after one year of transplant.  Skin check--done with outside dermatologist Maria E Tatum, DO in 2024 next due in one year.  Vaccination-- Flu received in 2024 UTD  / COVID  received in 2024 UTD   / PCV will obtain in [facility] in 2 month [place]. Repeat Home draw lab in 1 month.

## 2024-11-22 NOTE — PHYSICAL EXAM
[Scleral Icterus] : no scleral icterus [Hepatojugular Reflux] : no hepatojugular reflux [Ascites Fluid Wave] : no ascites fluid wave [Abdominal Ascites] : no abdominal ascites [Ascites Tense] : no ascites tense [Bleeding] : no active bleeding [Foul Odor] : no foul smell [Purulent Drainage] : no purulent drainage [Serosanguinous Drainage] : no serosanguinous drainage [Erythema] : not erythematous [Warm] : not warm [Tender] : not tender [Spider Angioma] : no spider angioma [Jaundice] : no jaundice [Palmar Erythema] : no palmar erythema [Asterixis] : no asterixis [Hepatic Encephalopathy] : no hepatic encephalopathy [de-identified] : RLQ pain around the incision line. sensitive to touch. [de-identified] : dry/intact [FreeTextEntry1] : c/o tenderness Left elbow

## 2024-11-22 NOTE — PHYSICAL EXAM
[Scleral Icterus] : no scleral icterus [Ascites Fluid Wave] : no ascites fluid wave [Hepatojugular Reflux] : no hepatojugular reflux [Abdominal Ascites] : no abdominal ascites [Ascites Tense] : no ascites tense [Bleeding] : no active bleeding [Foul Odor] : no foul smell [Purulent Drainage] : no purulent drainage [Serosanguinous Drainage] : no serosanguinous drainage [Erythema] : not erythematous [Warm] : not warm [Tender] : not tender [Spider Angioma] : no spider angioma [Jaundice] : no jaundice [Palmar Erythema] : no palmar erythema [Asterixis] : no asterixis [Hepatic Encephalopathy] : no hepatic encephalopathy [de-identified] : dry/intact [de-identified] : RLQ pain around the incision line. sensitive to touch. [FreeTextEntry1] : c/o tenderness Left elbow

## 2024-11-22 NOTE — HISTORY OF PRESENT ILLNESS
[TextBox_52] : CHANTALOS ID: [id number] Match ID: [id number]  [FreeTextEntry1] : Adan Nagel is a 46 y/o M with past medical history significant for HTN, essential thrombocytosis and decompensated ETOH cirrhosis now s/p OLT 2/14/2024 with Simulect induction, uncomplicated post-operative course discharged home on 2/27/24,   Re-admitted  March 5-20 - presents to [facility] ED on 3/5/24 with fever, diarrhea, lethargy, found to be lethargic, hypotensive and febrile, concerning for septic shock.  Blood cultures from 3/5 & 3/7 grew vancomycin resistant Enterococcus faecium (VRE), blood cultures cleared since 3/9. - CT CAP noncon (3/5): perihepatic collection - CT AP (3/8): portacaval collection measuring 9.8 x 4.4 x 3.4 cm  - ERCP 3/8 with placement of covered metal stent, positive for bile leak.  - IR drainage of perihepatic collection with pigtail drain placement on 3/12, fluid culture positive for VRE and candida glabrata. (drain removed 4/29) - Treated with Caspofungin/Daptomycin/unasyn by transplant ID - PICC line placed on 3/18   EXPLANT Pathology:  - Established cirrhosis with steatohepatitis  - Gallbladder with minimal chronic inflammation and cholelithiasis - Two benign lymph nodes with hemosiderin - The overall findings are consistent with steatohepatitis with cirrhosis.  9/17/2024 Noted with elevated LFTs bili 1.2 AST 51 ALT 45 ALKP 152 Had two ERCPs done on 9/26/2024 with Dr. Peters for post OLT biliary stricture,. Had repeat ERCP in 10/22/2024 due to stent migration, now with 2 plastic stents placed. Right sided abd pain improved after the repeat ERCP.   Interval Hx LOV 10/24/2024 9 months post-transplant.  Doing well. Reviewed most recent labs LFTs improving. TB down to 1.0.  Still having on and off nausea and vomiting with diarrhea. Seen GI Dr. Zendejas started on cholestyramine and stopped creaon. So far sx has been in control for the past 3 days. Has been compliant with the immunosuppressants.  Continue physical therapy near home. helps with torn left knee meniscus.  c/o RLQ pain along incision - retained suture in the stomach.  RPP-- have not complete intake with [facility] in [place]. but Peth has been negative.   Current Immuno:  Tacro level 6.0 (10/2024) on tacro 0.5 BID (Mon, Wed, Friday Sunday), Tacro 0.5 QD (Tues, Thursday).   Mycophenolate 360mg BID  PPX: Valcyte, Bactrim, PPI, Mag

## 2024-11-22 NOTE — PLAN
[FreeTextEntry1] : 47M PMHx HTN, essential thrombocytosis, transferred to [facility] from [facility] with decompensated EtOH cirrhosis for liver transplant evaluation. Underwent  donor orthotopic liver transplant on 24.   Readmitted 3/5-20- for VRE Bacteremia, bile leak s/p stent placement, perihepatic collection s/p drain placement removed on .   # GRAFT:  recent labs done in 2024 noted with elevated liver enzymes due to biliary stricture.  ERCP done on 2024 with Dr. Peters for post OLT biliary stricture. LFTs improved after procedure.  Had repeat ERCP in 10/22/2024 due to stent migration, now with 2 plastic stents placed.  Labs improved post ERCP TB down to 1.0 liver enzymes wnl.  Repeat ERCP scheduled for 2024.  # Immuno: Fk Level 6.0 on tacro 0.5 BID (Mon, Wed, ), Tacro 0.5 QD (Tues, Thursday). Special dosing given low metabolism to FK vs biliary stricture. Myfortic 360 BID. Patient is compliant with medications.    # PPX: Valcyte (high risk) noted low viremia . Will DC Valcyte today. continue CMV PCR check monthly. ( Completed Bactrim for 6 mos, PPI. DC Keppra for seizure pphx in 2024).   #Thrombocytopenia: PLT 70-->  residual splenomegaly.   # CBD Stent: Continue ursodial. Last ERCP in 10/22/2024 due to stent migration, now with 2 plastic stents placed. Due for repeat ERCP with Dr. Peters in 3 months 2025.   #Left elbow pain-- most likely related to hx bone spur at the joint. He had films done with outside ortho in the past. Scheduled for outpatient procedure in 2 weeks spoke to ortho office to hold off on the procedure. Will defer any elective procedure until post OLT one year.   #RPP - Never completed RPP intake. Continue to deny ETOH cravings. Understands that he cannot drink any alcohol to protect the liver. Refused medications to prevent cravings. Peth has been negative since OLT. Continue to PETH periodically. Social work to follow.   #Hypomagnesemia - Currently on supplemental Mg 800mg BID. Poor PO intake at baseline. Encouraged to incorporate foods high in magnesium to diet.   #General myalgia/neuropathy post transplant-- Cont gabapentin 300mg BID. cont PT weekly.  #essential tremor-- continue on propranolol 10mg twice daily. f/u with outside neurologist.   #Shx: currently unemployed. actively applying for jobs as an  or IT. Discussed to wear abd binder during physically demanding work.   #Health maintenance Colonoscopy--CF done in 2024 non bleeding hemorrhoids and rectal varices. no polyp. next due in 7-10 years.  Chest CT (smoking hx) --done in 3/2024 unremarkable.  DEXA--will obtain after one year of transplant.  Skin check--done with outside dermatologist Maria E Tatum, DO in 2024 next due in one year.  Vaccination-- Flu received in 2024 UTD  / COVID  received in 2024 UTD   / PCV will obtain in [facility] in 2 month [place]. Repeat Home draw lab in 1 month.

## 2025-01-17 NOTE — HISTORY OF PRESENT ILLNESS
[Alcoholic Liver Disease] : Alcoholic Liver Disease [Liver] : Liver [Donor after brain death (DBD] : Donor after brain death (DBD) [Basiliximab] : Basiliximab [Steroids] : Steroids [Positive/Negative] : Positive/Negative [TextBox_52] : CHANTALOS ID: [id number] Match ID: [id number]  [FreeTextEntry1] : Adan Nagel is a 48 y/o M with past medical history significant for HTN, essential thrombocytosis and decompensated ETOH cirrhosis now s/p OLT 2/14/2024 with Simulect induction, uncomplicated post-operative course discharged home on 2/27/24,   Re-admitted  March 5-20 - presents to [facility] ED on 3/5/24 with fever, diarrhea, lethargy, found to be lethargic, hypotensive and febrile, concerning for septic shock.  Blood cultures from 3/5 & 3/7 grew vancomycin resistant Enterococcus faecium (VRE), blood cultures cleared since 3/9. - CT CAP noncon (3/5): perihepatic collection - CT AP (3/8): portacaval collection measuring 9.8 x 4.4 x 3.4 cm  - ERCP 3/8 with placement of covered metal stent, positive for bile leak.  - IR drainage of perihepatic collection with pigtail drain placement on 3/12, fluid culture positive for VRE and candida glabrata. (drain removed 4/29) - Treated with Caspofungin/Daptomycin/unasyn by transplant ID - PICC line placed on 3/18   EXPLANT Pathology:  - Established cirrhosis with steatohepatitis  - Gallbladder with minimal chronic inflammation and cholelithiasis - Two benign lymph nodes with hemosiderin - The overall findings are consistent with steatohepatitis with cirrhosis.  9/17/2024 Noted with elevated LFTs bili 1.2 AST 51 ALT 45 ALKP 152 Had two ERCPs done on 9/26/2024 with Dr. Peters for post OLT biliary stricture,. Had repeat ERCP in 10/22/2024 due to stent migration, now with 2 plastic stents placed. Right sided abd pain improved after the repeat ERCP.   Interval Hx LOV 11/2024 11 months post-transplant.   Has been compliant with the immunosuppressants.  Continue physical therapy near home. helps with torn left knee meniscus.   RPP-- have not complete intake with [facility] in [place]. but Peth has been negative.  Patient discharged from [facility] yesterday Admitted for cholangitis and biliary stent occlusion Replaced with 3 plastic stents Placed on Abx Cefpodoxime/Linezolid until 1/20  Current Immuno:  Tacro level 6.0 (10/2024) on tacro 0.5 BID (Mon, Wed, Friday Sunday), Tacro 0.5 QD (Tues, Thursday).   Mycophenolate 360mg BID  PPX: Valcyte, Bactrim, PPI, Mag

## 2025-01-17 NOTE — PHYSICAL EXAM
[Alert] : alert [No Acute Distress] : no acute distress [EOMI] : extra ocular movement intact [PERRLA] : pupils equal, round, reactive to light and accomodation [Full ROM] : full range of motion [No Lymphadenopathy] : no lymphadenopathy [Clear to Auscultation] : lungs were clear to auscultation bilaterally [Breathing Comfortably on room air] : breathing comfortably on room air [Normal Rate] : normal rate [Regular Rhythm] : regular rhythm [Soft] : soft [Normal Bowel Sounds] : normal bowel sounds [Clean] : clean [Dry] : dry [Healing Well] : healing well [No Edema] : no edema [No Skin Discoloration] : no skin discoloration [No Ulcers] : no ulcers [Strength in Tact] : strength in tact [No Rash] : no rash [Scleral Icterus] : no scleral icterus [Hepatojugular Reflux] : no hepatojugular reflux [Ascites Fluid Wave] : no ascites fluid wave [Abdominal Ascites] : no abdominal ascites [Ascites Tense] : no ascites tense [Bleeding] : no active bleeding [Foul Odor] : no foul smell [Purulent Drainage] : no purulent drainage [Serosanguinous Drainage] : no serosanguinous drainage [Erythema] : not erythematous [Warm] : not warm [Tender] : not tender [Spider Angioma] : no spider angioma [Jaundice] : no jaundice [Palmar Erythema] : no palmar erythema [Asterixis] : no asterixis [Hepatic Encephalopathy] : no hepatic encephalopathy [de-identified] : RLQ pain around the incision line. sensitive to touch. [de-identified] : dry/intact [FreeTextEntry1] : c/o tenderness Left elbow

## 2025-01-17 NOTE — HISTORY OF PRESENT ILLNESS
[Alcoholic Liver Disease] : Alcoholic Liver Disease [Liver] : Liver [Donor after brain death (DBD] : Donor after brain death (DBD) [Basiliximab] : Basiliximab [Steroids] : Steroids [Positive/Negative] : Positive/Negative [TextBox_52] : CHANTALOS ID: [id number] Match ID: [id number]  [FreeTextEntry1] : Adan Nagel is a 48 y/o M with past medical history significant for HTN, essential thrombocytosis and decompensated ETOH cirrhosis now s/p OLT 2/14/2024 with Simulect induction, uncomplicated post-operative course discharged home on 2/27/24,   Re-admitted  March 5-20 - presents to [facility] ED on 3/5/24 with fever, diarrhea, lethargy, found to be lethargic, hypotensive and febrile, concerning for septic shock.  Blood cultures from 3/5 & 3/7 grew vancomycin resistant Enterococcus faecium (VRE), blood cultures cleared since 3/9. - CT CAP noncon (3/5): perihepatic collection - CT AP (3/8): portacaval collection measuring 9.8 x 4.4 x 3.4 cm  - ERCP 3/8 with placement of covered metal stent, positive for bile leak.  - IR drainage of perihepatic collection with pigtail drain placement on 3/12, fluid culture positive for VRE and candida glabrata. (drain removed 4/29) - Treated with Caspofungin/Daptomycin/unasyn by transplant ID - PICC line placed on 3/18   EXPLANT Pathology:  - Established cirrhosis with steatohepatitis  - Gallbladder with minimal chronic inflammation and cholelithiasis - Two benign lymph nodes with hemosiderin - The overall findings are consistent with steatohepatitis with cirrhosis.  9/17/2024 Noted with elevated LFTs bili 1.2 AST 51 ALT 45 ALKP 152 Had two ERCPs done on 9/26/2024 with Dr. Peters for post OLT biliary stricture,. Had repeat ERCP in 10/22/2024 due to stent migration, now with 2 plastic stents placed. Right sided abd pain improved after the repeat ERCP.   Interval Hx LOV 11/2024 11 months post-transplant.   Has been compliant with the immunosuppressants.  Continue physical therapy near home. helps with torn left knee meniscus.   RPP-- have not complete intake with [facility] in [place]. but Peth has been negative.  Patient discharged from [facility] yesterday Admitted for cholangitis and biliary stent occlusion Replaced with 3 plastic stents Placed on Abx Cefpodoxime/Linezolid until 1/20  Current Immuno:  Tacro level 6.0 (10/2024) on tacro 0.5 BID (Mon, Wed, Friday Sunday), Tacro 0.5 QD (Tues, Thursday).   Mycophenolate 360mg BID  PPX: Valcyte, Bactrim, PPI, Mag

## 2025-01-17 NOTE — PLAN
[FreeTextEntry1] : 48M PMHx HTN, essential thrombocytosis, transferred to [facility] from [facility] with decompensated EtOH cirrhosis for liver transplant evaluation. Underwent  donor orthotopic liver transplant on 24.   Readmitted 3/5-- for VRE Bacteremia, bile leak s/p stent placement, perihepatic collection s/p drain placement removed on .   ERCP with 2 stents - admitted for stent occlusion now replaced with 3 plastic biliary stents  # GRAFT:  recent labs done in 2024 noted with elevated liver enzymes due to biliary stricture.  ERCP done on 2024 with Dr. Peters for post OLT biliary stricture. LFTs improved after procedure.  Had repeat ERCP in 10/22/2024 due to stent migration, now with 2 plastic stents placed.  Labs improved post ERCP TB down to 1.0 liver enzymes wnl.  Patient admitted for stent clogging - had repeat ERCP with 3 stents   # Immuno: Fk Level 6.0 on tacro 0.5 BID (Mon, Wed, ), Tacro 0.5 QD (Tues, Thursday). Special dosing given low metabolism to FK vs biliary stricture.   Off Myfortic for now  #Abx Completing Linezolid / Cefpodoxime though reports only having Cefpodoxime - confirmed with pharmacy that he took both bottles  # PPX: Compelted PPX. OFf Valcyte - periodic CMV Checks   #Thrombocytopenia: PLT 70-->  residual splenomegaly.   # CBD Stent: Continue ursodial. Repeat ERCP 3M  #Left elbow pain-- most likely related to hx bone spur at the joint. He had films done with outside ortho in the past. Scheduled for outpatient procedure in 2 weeks spoke to ortho office to hold off on the procedure. Will defer any elective procedure until post OLT one year.   #RPP - Never completed RPP intake. Continue to deny ETOH cravings. Understands that he cannot drink any alcohol to protect the liver. Refused medications to prevent cravings. Peth has been negative since OLT. Continue to PETH periodically. Social work to follow.   #Hypomagnesemia - Currently on supplemental Mg 800mg BID. Poor PO intake at baseline. Encouraged to incorporate foods high in magnesium to diet.   #General myalgia/neuropathy post transplant-- Cont gabapentin 300mg BID. cont PT weekly.  #essential tremor-- continue on propranolol 10mg twice daily. f/u with outside neurologist.   #Shx: currently unemployed. actively applying for jobs as an  or IT. Discussed to wear abd binder during physically demanding work.   #Health maintenance Colonoscopy--CF done in 2024 non bleeding hemorrhoids and rectal varices. no polyp. next due in 7-10 years.  Chest CT (smoking hx) --done in 3/2024 unremarkable.  DEXA--will obtain after one year of transplant.  Skin check--done with outside dermatologist Maria E Tatum, DO in 2024 next due in one year.  Vaccination-- Flu received in 2024 UTD  / COVID  received in 2024 UTD   / PCV will obtain in [facility] in 2 month [place]. Repeat Home draw lab in 1 month.

## 2025-01-17 NOTE — PHYSICAL EXAM
[Alert] : alert [No Acute Distress] : no acute distress [EOMI] : extra ocular movement intact [PERRLA] : pupils equal, round, reactive to light and accomodation [Full ROM] : full range of motion [No Lymphadenopathy] : no lymphadenopathy [Clear to Auscultation] : lungs were clear to auscultation bilaterally [Breathing Comfortably on room air] : breathing comfortably on room air [Normal Rate] : normal rate [Regular Rhythm] : regular rhythm [Soft] : soft [Normal Bowel Sounds] : normal bowel sounds [Clean] : clean [Dry] : dry [Healing Well] : healing well [No Edema] : no edema [No Skin Discoloration] : no skin discoloration [No Ulcers] : no ulcers [Strength in Tact] : strength in tact [No Rash] : no rash [Scleral Icterus] : no scleral icterus [Hepatojugular Reflux] : no hepatojugular reflux [Ascites Fluid Wave] : no ascites fluid wave [Abdominal Ascites] : no abdominal ascites [Ascites Tense] : no ascites tense [Bleeding] : no active bleeding [Foul Odor] : no foul smell [Purulent Drainage] : no purulent drainage [Serosanguinous Drainage] : no serosanguinous drainage [Erythema] : not erythematous [Warm] : not warm [Tender] : not tender [Spider Angioma] : no spider angioma [Jaundice] : no jaundice [Palmar Erythema] : no palmar erythema [Asterixis] : no asterixis [Hepatic Encephalopathy] : no hepatic encephalopathy [de-identified] : dry/intact [de-identified] : RLQ pain around the incision line. sensitive to touch. [FreeTextEntry1] : c/o tenderness Left elbow

## 2025-01-17 NOTE — PLAN
[FreeTextEntry1] : 48M PMHx HTN, essential thrombocytosis, transferred to [facility] from [facility] with decompensated EtOH cirrhosis for liver transplant evaluation. Underwent  donor orthotopic liver transplant on 24.   Readmitted 3/5-- for VRE Bacteremia, bile leak s/p stent placement, perihepatic collection s/p drain placement removed on .   ERCP with 2 stents - admitted for stent occlusion now replaced with 3 plastic biliary stents  # GRAFT:  recent labs done in 2024 noted with elevated liver enzymes due to biliary stricture.  ERCP done on 2024 with Dr. Peters for post OLT biliary stricture. LFTs improved after procedure.  Had repeat ERCP in 10/22/2024 due to stent migration, now with 2 plastic stents placed.  Labs improved post ERCP TB down to 1.0 liver enzymes wnl.  Patient admitted for stent clogging - had repeat ERCP with 3 stents   # Immuno: Fk Level 6.0 on tacro 0.5 BID (Mon, Wed, ), Tacro 0.5 QD (Tues, Thursday). Special dosing given low metabolism to FK vs biliary stricture.   Off Myfortic for now  #Abx Completing Linezolid / Cefpodoxime though reports only having Cefpodoxime - confirmed with pharmacy that he took both bottles  # PPX: Compelted PPX. OFf Valcyte - periodic CMV Checks   #Thrombocytopenia: PLT 70-->  residual splenomegaly.   # CBD Stent: Continue ursodial. Repeat ERCP 3M  #Left elbow pain-- most likely related to hx bone spur at the joint. He had films done with outside ortho in the past. Scheduled for outpatient procedure in 2 weeks spoke to ortho office to hold off on the procedure. Will defer any elective procedure until post OLT one year.   #RPP - Never completed RPP intake. Continue to deny ETOH cravings. Understands that he cannot drink any alcohol to protect the liver. Refused medications to prevent cravings. Peth has been negative since OLT. Continue to PETH periodically. Social work to follow.   #Hypomagnesemia - Currently on supplemental Mg 800mg BID. Poor PO intake at baseline. Encouraged to incorporate foods high in magnesium to diet.   #General myalgia/neuropathy post transplant-- Cont gabapentin 300mg BID. cont PT weekly.  #essential tremor-- continue on propranolol 10mg twice daily. f/u with outside neurologist.   #Shx: currently unemployed. actively applying for jobs as an  or IT. Discussed to wear abd binder during physically demanding work.   #Health maintenance Colonoscopy--CF done in 2024 non bleeding hemorrhoids and rectal varices. no polyp. next due in 7-10 years.  Chest CT (smoking hx) --done in 3/2024 unremarkable.  DEXA--will obtain after one year of transplant.  Skin check--done with outside dermatologist Maria E Tatum, DO in 2024 next due in one year.  Vaccination-- Flu received in 2024 UTD  / COVID  received in 2024 UTD   / PCV will obtain in [facility] in 2 month [place]. Repeat Home draw lab in 1 month.

## 2025-02-09 NOTE — PHYSICAL EXAM
[Alert] : alert [No Acute Distress] : no acute distress [EOMI] : extra ocular movement intact [PERRLA] : pupils equal, round, reactive to light and accomodation [Full ROM] : full range of motion [No Lymphadenopathy] : no lymphadenopathy [Clear to Auscultation] : lungs were clear to auscultation bilaterally [Breathing Comfortably on room air] : breathing comfortably on room air [Normal Rate] : normal rate [Regular Rhythm] : regular rhythm [Soft] : soft [Normal Bowel Sounds] : normal bowel sounds [Clean] : clean [Dry] : dry [Healing Well] : healing well [No Edema] : no edema [No Skin Discoloration] : no skin discoloration [No Ulcers] : no ulcers [Strength in Tact] : strength in tact [No Rash] : no rash [Scleral Icterus] : no scleral icterus [Hepatojugular Reflux] : no hepatojugular reflux [Ascites Fluid Wave] : no ascites fluid wave [Abdominal Ascites] : no abdominal ascites [Ascites Tense] : no ascites tense [Bleeding] : no active bleeding [Foul Odor] : no foul smell [Purulent Drainage] : no purulent drainage [Serosanguinous Drainage] : no serosanguinous drainage [Erythema] : not erythematous [Warm] : not warm [Tender] : not tender [Spider Angioma] : no spider angioma [Jaundice] : no jaundice [Palmar Erythema] : no palmar erythema [Asterixis] : no asterixis [Hepatic Encephalopathy] : no hepatic encephalopathy [de-identified] : RLQ pain around the incision line. sensitive to touch. [de-identified] : dry/intact [FreeTextEntry1] : c/o tenderness Left elbow

## 2025-02-09 NOTE — HISTORY OF PRESENT ILLNESS
[Alcoholic Liver Disease] : Alcoholic Liver Disease [Liver] : Liver [Donor after brain death (DBD] : Donor after brain death (DBD) [Basiliximab] : Basiliximab [Steroids] : Steroids [Positive/Negative] : Positive/Negative [TextBox_52] : CHANTALOS ID: [id number] Match ID: [id number]  [FreeTextEntry1] : Adan Nagel is a 47y/o M with past medical history significant for HTN, essential thrombocytosis and decompensated ETOH cirrhosis now s/p OLT 2/14/2024 with Simulect induction, uncomplicated post-operative course discharged home on 2/27/24,   Re-admitted  March 5-20 - presents to [facility] ED on 3/5/24 with fever, diarrhea, lethargy, found to be lethargic, hypotensive and febrile, concerning for septic shock.  Blood cultures from 3/5 & 3/7 grew vancomycin resistant Enterococcus faecium (VRE), blood cultures cleared since 3/9. - CT CAP noncon (3/5): perihepatic collection - CT AP (3/8): portacaval collection measuring 9.8 x 4.4 x 3.4 cm  - ERCP 3/8 with placement of covered metal stent, positive for bile leak.  - IR drainage of perihepatic collection with pigtail drain placement on 3/12, fluid culture positive for VRE and candida glabrata. (drain removed 4/29) - Treated with Caspofungin/Daptomycin/unasyn by transplant ID - PICC line placed on 3/18   EXPLANT Pathology:  - Established cirrhosis with steatohepatitis  - Gallbladder with minimal chronic inflammation and cholelithiasis - Two benign lymph nodes with hemosiderin - The overall findings are consistent with steatohepatitis with cirrhosis.  9/17/2024 Noted with elevated LFTs bili 1.2 AST 51 ALT 45 ALKP 152 Had two ERCPs done on 9/26/2024 with Dr. Peters for post OLT biliary stricture,. Had repeat ERCP in 10/22/2024 due to stent migration, now with 2 plastic stents placed. Right sided abd pain improved after the repeat ERCP.   Interval Hx  close to 12 months post-transplant.   Has been compliant with the immunosuppressants and visits.  recent admission in January 2025 to [facility] for cholangitis and biliary stent occlusion now with 3 plastic stents placed. completed Abx Cefpodoxime/Linezolid.  Patient today reports continue to have chronic diarrhea. inconsistent pattern sometimes once sometimes up to 6 times a day. usually starts with abdominal pain then prompts the urgency. Relief after defecation. Denies any fevers, chills. Does have nausea or vomiting in the mornings when just woke up. per patient these are GI symptoms that he has for years. Imodium helps but then he will get constipated the next day.  He also reports does not always has good appetite. eat meals in consistently.  RPP-- have not complete intake with [facility] in [place]. but Peth has been negative.   Current Immuno:  Tacro level 8.0 on tacro 0.5 BID (Mon, Wed, Friday Sunday), Tacro 0.5 QD (Tues, Thursday).   Mycophenolate 360mg BID

## 2025-02-09 NOTE — HISTORY OF PRESENT ILLNESS
[Alcoholic Liver Disease] : Alcoholic Liver Disease [Liver] : Liver [Donor after brain death (DBD] : Donor after brain death (DBD) [Basiliximab] : Basiliximab [Steroids] : Steroids [Positive/Negative] : Positive/Negative [TextBox_52] : CHANTALOS ID: [id number] Match ID: [id number]  [FreeTextEntry1] : Adan Nagel is a 47y/o M with past medical history significant for HTN, essential thrombocytosis and decompensated ETOH cirrhosis now s/p OLT 2/14/2024 with Simulect induction, uncomplicated post-operative course discharged home on 2/27/24,   Re-admitted  March 5-20 - presents to [facility] ED on 3/5/24 with fever, diarrhea, lethargy, found to be lethargic, hypotensive and febrile, concerning for septic shock.  Blood cultures from 3/5 & 3/7 grew vancomycin resistant Enterococcus faecium (VRE), blood cultures cleared since 3/9. - CT CAP noncon (3/5): perihepatic collection - CT AP (3/8): portacaval collection measuring 9.8 x 4.4 x 3.4 cm  - ERCP 3/8 with placement of covered metal stent, positive for bile leak.  - IR drainage of perihepatic collection with pigtail drain placement on 3/12, fluid culture positive for VRE and candida glabrata. (drain removed 4/29) - Treated with Caspofungin/Daptomycin/unasyn by transplant ID - PICC line placed on 3/18   EXPLANT Pathology:  - Established cirrhosis with steatohepatitis  - Gallbladder with minimal chronic inflammation and cholelithiasis - Two benign lymph nodes with hemosiderin - The overall findings are consistent with steatohepatitis with cirrhosis.  9/17/2024 Noted with elevated LFTs bili 1.2 AST 51 ALT 45 ALKP 152 Had two ERCPs done on 9/26/2024 with Dr. Peters for post OLT biliary stricture,. Had repeat ERCP in 10/22/2024 due to stent migration, now with 2 plastic stents placed. Right sided abd pain improved after the repeat ERCP.   Interval Hx  close to 12 months post-transplant.   Has been compliant with the immunosuppressants and visits.  recent admission in January 2025 to [facility] for cholangitis and biliary stent occlusion now with 3 plastic stents placed. completed Abx Cefpodoxime/Linezolid.  Patient today reports continue to have chronic diarrhea. inconsistent pattern sometimes once sometimes up to 6 times a day. usually starts with abdominal pain then prompts the urgency. Relief after defecation. Denies any fevers, chills. Does have nausea or vomiting in the mornings when just woke up. per patient these are GI symptoms that he has for years. Imodium helps but then he will get constipated the next day.  He also reports does not always has good appetite. eat meals in consistently.  RPP-- have not complete intake with [facility] in [place]. but Peth has been negative.   Current Immuno:  Tacro level 8.0 on tacro 0.5 BID (Mon, Wed, Friday Sunday), Tacro 0.5 QD (Tues, Thursday).   Mycophenolate 360mg BID

## 2025-02-09 NOTE — PHYSICAL EXAM
[Alert] : alert [No Acute Distress] : no acute distress [EOMI] : extra ocular movement intact [PERRLA] : pupils equal, round, reactive to light and accomodation [Full ROM] : full range of motion [No Lymphadenopathy] : no lymphadenopathy [Clear to Auscultation] : lungs were clear to auscultation bilaterally [Breathing Comfortably on room air] : breathing comfortably on room air [Normal Rate] : normal rate [Regular Rhythm] : regular rhythm [Soft] : soft [Normal Bowel Sounds] : normal bowel sounds [Clean] : clean [Dry] : dry [Healing Well] : healing well [No Edema] : no edema [No Skin Discoloration] : no skin discoloration [No Ulcers] : no ulcers [Strength in Tact] : strength in tact [No Rash] : no rash [Scleral Icterus] : no scleral icterus [Hepatojugular Reflux] : no hepatojugular reflux [Ascites Fluid Wave] : no ascites fluid wave [Abdominal Ascites] : no abdominal ascites [Ascites Tense] : no ascites tense [Bleeding] : no active bleeding [Foul Odor] : no foul smell [Purulent Drainage] : no purulent drainage [Serosanguinous Drainage] : no serosanguinous drainage [Erythema] : not erythematous [Warm] : not warm [Tender] : not tender [Spider Angioma] : no spider angioma [Jaundice] : no jaundice [Palmar Erythema] : no palmar erythema [Asterixis] : no asterixis [Hepatic Encephalopathy] : no hepatic encephalopathy [de-identified] : RLQ pain around the incision line. sensitive to touch. [de-identified] : dry/intact [FreeTextEntry1] : c/o tenderness Left elbow

## 2025-02-09 NOTE — PLAN
[FreeTextEntry1] : 48M PMHx HTN, essential thrombocytosis, transferred to [facility] from [facility] with decompensated EtOH cirrhosis for liver transplant evaluation. Underwent  donor orthotopic liver transplant on 24.   Readmitted 3/5-- for VRE Bacteremia, bile leak s/p stent placement, perihepatic collection s/p drain placement removed on .   ERCP with 2 stents - admitted for stent occlusion now replaced with 3 plastic biliary stents  # GRAFT:  Relatively stable graft function Multiple episodes of cholangitis and repeat ERCP Last in 2025 with 3 plastic biliary stent placed - plan for repeat ERCP in April  # Immuno: Fk Level 6.0 on tacro 0.5 BID (Mon, Wed, ), Tacro 0.5 QD (Tues, Thursday). Special dosing given low metabolism to FK vs biliary stricture.   Off Myfortic for now until stool test results result  # PPX:  Completed PPX - periodic CMV checks  #chronic diarrhea- will check for stool studies. possibly chronic pancreatitis. will also need f/u with GI. - did not respond to Creon - could be 2/2 Mg - reduce Magnesium - suggest IV replacement   #Thrombocytopenia: PLT 70-->  residual splenomegaly.   # CBD Stent: Continue ursodial. last ERCP in 2025 new stents placed. Repeat ERCP 3M due in 2025 Sofya Peters MD    #Left elbow pain-- most likely related to hx bone spur at the joint. He had films done with outside ortho in the past. Will defer any elective procedure until post OLT one year.   #RPP - Never completed RPP intake. Continue to deny ETOH cravings. Understands that he cannot drink any alcohol to protect the liver. Refused medications to prevent cravings. Peth has been negative since OLT. Continue to PETH periodically..   #Hypomagnesemia - Currently on supplemental Mg 800mg BID. Poor PO intake at baseline. now also with worsening diarrhea. Will taper off magnesium to see if diarrhea improves. will set up IV Magnesium for repletion. Encouraged to incorporate foods high in magnesium to diet.   #General myalgia/neuropathy post transplant-- Cont gabapentin 300mg BID. cont PT weekly.  #essential tremor-- continue on propranolol 10mg twice daily. f/u with outside neurologist.   #Shx: currently unemployed. actively applying for jobs.    #Health maintenance Colonoscopy--CF done in 2024 non bleeding hemorrhoids and rectal varices. no polyp. next due in 7-10 years.  Chest CT (smoking hx) --done in 3/2024 unremarkable.  DEXA--will obtain after one year of transplant - order at time of next visit Skin check--done with outside dermatologist Maria E Tatum, DO in 2024 next due in one year.  Vaccination-- Flu received in 2024 UTD  / COVID  received in 2024 UTD   / PCV will obtain in [facility] in 2 month [place]. Repeat Home draw lab in 1 month.

## 2025-02-09 NOTE — PLAN
[FreeTextEntry1] : 48M PMHx HTN, essential thrombocytosis, transferred to [facility] from [facility] with decompensated EtOH cirrhosis for liver transplant evaluation. Underwent  donor orthotopic liver transplant on 24.   Readmitted 3/5-- for VRE Bacteremia, bile leak s/p stent placement, perihepatic collection s/p drain placement removed on .   ERCP with 2 stents - admitted for stent occlusion now replaced with 3 plastic biliary stents  # GRAFT:  Relatively stable graft function Multiple episodes of cholangitis and repeat ERCP Last in 2025 with 3 plastic biliary stent placed - plan for repeat ERCP in April  # Immuno: Fk Level 6.0 on tacro 0.5 BID (Mon, Wed, ), Tacro 0.5 QD (Tues, Thursday). Special dosing given low metabolism to FK vs biliary stricture.   Off Myfortic for now until stool test results result  # PPX:  Completed PPX - periodic CMV checks  #chronic diarrhea- will check for stool studies. possibly chronic pancreatitis. will also need f/u with GI. - did not respond to Creon - could be 2/2 Mg - reduce Magnesium - suggest IV replacement   #Thrombocytopenia: PLT 70-->  residual splenomegaly.   # CBD Stent: Continue ursodial. last ERCP in 2025 new stents placed. Repeat ERCP 3M due in 2025 Sofya Peters MD    #Left elbow pain-- most likely related to hx bone spur at the joint. He had films done with outside ortho in the past. Will defer any elective procedure until post OLT one year.   #RPP - Never completed RPP intake. Continue to deny ETOH cravings. Understands that he cannot drink any alcohol to protect the liver. Refused medications to prevent cravings. Peth has been negative since OLT. Continue to PETH periodically..   #Hypomagnesemia - Currently on supplemental Mg 800mg BID. Poor PO intake at baseline. now also with worsening diarrhea. Will taper off magnesium to see if diarrhea improves. will set up IV Magnesium for repletion. Encouraged to incorporate foods high in magnesium to diet.   #General myalgia/neuropathy post transplant-- Cont gabapentin 300mg BID. cont PT weekly.  #essential tremor-- continue on propranolol 10mg twice daily. f/u with outside neurologist.   #Shx: currently unemployed. actively applying for jobs.    #Health maintenance Colonoscopy--CF done in 2024 non bleeding hemorrhoids and rectal varices. no polyp. next due in 7-10 years.  Chest CT (smoking hx) --done in 3/2024 unremarkable.  DEXA--will obtain after one year of transplant - order at time of next visit Skin check--done with outside dermatologist Maria E Tatum, DO in 2024 next due in one year.  Vaccination-- Flu received in 2024 UTD  / COVID  received in 2024 UTD   / PCV will obtain in [facility] in 2 month [place]. Repeat Home draw lab in 1 month.

## 2025-02-09 NOTE — HISTORY OF PRESENT ILLNESS
[Alcoholic Liver Disease] : Alcoholic Liver Disease [Liver] : Liver [Donor after brain death (DBD] : Donor after brain death (DBD) [Basiliximab] : Basiliximab [Steroids] : Steroids [Positive/Negative] : Positive/Negative [TextBox_52] : CHANTALOS ID: [id number] Match ID: [id number]  [FreeTextEntry1] : Adan Nagel is a 49y/o M with past medical history significant for HTN, essential thrombocytosis and decompensated ETOH cirrhosis now s/p OLT 2/14/2024 with Simulect induction, uncomplicated post-operative course discharged home on 2/27/24,   Re-admitted  March 5-20 - presents to [facility] ED on 3/5/24 with fever, diarrhea, lethargy, found to be lethargic, hypotensive and febrile, concerning for septic shock.  Blood cultures from 3/5 & 3/7 grew vancomycin resistant Enterococcus faecium (VRE), blood cultures cleared since 3/9. - CT CAP noncon (3/5): perihepatic collection - CT AP (3/8): portacaval collection measuring 9.8 x 4.4 x 3.4 cm  - ERCP 3/8 with placement of covered metal stent, positive for bile leak.  - IR drainage of perihepatic collection with pigtail drain placement on 3/12, fluid culture positive for VRE and candida glabrata. (drain removed 4/29) - Treated with Caspofungin/Daptomycin/unasyn by transplant ID - PICC line placed on 3/18   EXPLANT Pathology:  - Established cirrhosis with steatohepatitis  - Gallbladder with minimal chronic inflammation and cholelithiasis - Two benign lymph nodes with hemosiderin - The overall findings are consistent with steatohepatitis with cirrhosis.  9/17/2024 Noted with elevated LFTs bili 1.2 AST 51 ALT 45 ALKP 152 Had two ERCPs done on 9/26/2024 with Dr. Peters for post OLT biliary stricture,. Had repeat ERCP in 10/22/2024 due to stent migration, now with 2 plastic stents placed. Right sided abd pain improved after the repeat ERCP.   Interval Hx  close to 12 months post-transplant.   Has been compliant with the immunosuppressants and visits.  recent admission in January 2025 to [facility] for cholangitis and biliary stent occlusion now with 3 plastic stents placed. completed Abx Cefpodoxime/Linezolid.  Patient today reports continue to have chronic diarrhea. inconsistent pattern sometimes once sometimes up to 6 times a day. usually starts with abdominal pain then prompts the urgency. Relief after defecation. Denies any fevers, chills. Does have nausea or vomiting in the mornings when just woke up. per patient these are GI symptoms that he has for years. Imodium helps but then he will get constipated the next day.  He also reports does not always has good appetite. eat meals in consistently.  RPP-- have not complete intake with [facility] in [place]. but Peth has been negative.   Current Immuno:  Tacro level 8.0 on tacro 0.5 BID (Mon, Wed, Friday Sunday), Tacro 0.5 QD (Tues, Thursday).   Mycophenolate 360mg BID

## 2025-02-09 NOTE — PHYSICAL EXAM
[Alert] : alert [No Acute Distress] : no acute distress [EOMI] : extra ocular movement intact [PERRLA] : pupils equal, round, reactive to light and accomodation [Full ROM] : full range of motion [No Lymphadenopathy] : no lymphadenopathy [Clear to Auscultation] : lungs were clear to auscultation bilaterally [Breathing Comfortably on room air] : breathing comfortably on room air [Normal Rate] : normal rate [Regular Rhythm] : regular rhythm [Soft] : soft [Normal Bowel Sounds] : normal bowel sounds [Clean] : clean [Dry] : dry [Healing Well] : healing well [No Edema] : no edema [No Skin Discoloration] : no skin discoloration [No Ulcers] : no ulcers [Strength in Tact] : strength in tact [No Rash] : no rash [Scleral Icterus] : no scleral icterus [Hepatojugular Reflux] : no hepatojugular reflux [Ascites Fluid Wave] : no ascites fluid wave [Abdominal Ascites] : no abdominal ascites [Ascites Tense] : no ascites tense [Bleeding] : no active bleeding [Foul Odor] : no foul smell [Purulent Drainage] : no purulent drainage [Serosanguinous Drainage] : no serosanguinous drainage [Erythema] : not erythematous [Warm] : not warm [Tender] : not tender [Spider Angioma] : no spider angioma [Jaundice] : no jaundice [Palmar Erythema] : no palmar erythema [Asterixis] : no asterixis [Hepatic Encephalopathy] : no hepatic encephalopathy [de-identified] : RLQ pain around the incision line. sensitive to touch. [de-identified] : dry/intact [FreeTextEntry1] : c/o tenderness Left elbow

## 2025-02-09 NOTE — PLAN
[FreeTextEntry1] : 48M PMHx HTN, essential thrombocytosis, transferred to [facility] from [facility] with decompensated EtOH cirrhosis for liver transplant evaluation. Underwent  donor orthotopic liver transplant on 24.   Readmitted 3/5-- for VRE Bacteremia, bile leak s/p stent placement, perihepatic collection s/p drain placement removed on .   ERCP with 2 stents - admitted for stent occlusion now replaced with 3 plastic biliary stents  # GRAFT:  Relatively stable graft function Multiple episodes of cholangitis and repeat ERCP Last in 2025 with 3 plastic biliary stent placed - plan for repeat ERCP in April  # Immuno: Fk Level 6.0 on tacro 0.5 BID (Mon, Wed, ), Tacro 0.5 QD (Tues, Thursday). Special dosing given low metabolism to FK vs biliary stricture.   Off Myfortic for now until stool test results result  # PPX:  Completed PPX - periodic CMV checks  #chronic diarrhea- will check for stool studies. possibly chronic pancreatitis. will also need f/u with GI. - did not respond to Creon - could be 2/2 Mg - reduce Magnesium - suggest IV replacement   #Thrombocytopenia: PLT 70-->  residual splenomegaly.   # CBD Stent: Continue ursodial. last ERCP in 2025 new stents placed. Repeat ERCP 3M due in 2025 Soyfa Peters MD    #Left elbow pain-- most likely related to hx bone spur at the joint. He had films done with outside ortho in the past. Will defer any elective procedure until post OLT one year.   #RPP - Never completed RPP intake. Continue to deny ETOH cravings. Understands that he cannot drink any alcohol to protect the liver. Refused medications to prevent cravings. Peth has been negative since OLT. Continue to PETH periodically..   #Hypomagnesemia - Currently on supplemental Mg 800mg BID. Poor PO intake at baseline. now also with worsening diarrhea. Will taper off magnesium to see if diarrhea improves. will set up IV Magnesium for repletion. Encouraged to incorporate foods high in magnesium to diet.   #General myalgia/neuropathy post transplant-- Cont gabapentin 300mg BID. cont PT weekly.  #essential tremor-- continue on propranolol 10mg twice daily. f/u with outside neurologist.   #Shx: currently unemployed. actively applying for jobs.    #Health maintenance Colonoscopy--CF done in 2024 non bleeding hemorrhoids and rectal varices. no polyp. next due in 7-10 years.  Chest CT (smoking hx) --done in 3/2024 unremarkable.  DEXA--will obtain after one year of transplant - order at time of next visit Skin check--done with outside dermatologist Maria E Tatum, DO in 2024 next due in one year.  Vaccination-- Flu received in 2024 UTD  / COVID  received in 2024 UTD   / PCV will obtain in [facility] in 2 month [place]. Repeat Home draw lab in 1 month.

## 2025-02-19 NOTE — HISTORY OF PRESENT ILLNESS
[Yes] : Yes [6] : 6 [Denies] : Denies [No] : No [N/A] : N/A [Declined] : Declined [Informed consent documented in EHR.] : Informed consent documented in EHR. [Left upper extremity] : Left upper extremity [22g] : 22g [Start Time: ___] : Medication Start Time: [unfilled] [End Time: ___] : Medication End Time: [unfilled] [Medication Name: ___] : Medication Name: [unfilled] [Total Amount Administered: ___] : Total Amount Administered: [unfilled] [IV discontinued. Intact. No signs or symptoms of IV complications noted. Time: ___] : IV discontinued. Intact. No signs or symptoms of IV complications noted. Time: [unfilled] [Patient  instructed to seek medical attention with signs and symptoms of adverse effects] : Patient  instructed to seek medical attention with signs and symptoms of adverse effects [Patient left unit in no acute distress] : Patient left unit in no acute distress [Medications administered as ordered and tolerated well.] : Medications administered as ordered and tolerated well. [de-identified] : Abdomen [de-identified] : 7107 [de-identified] : Patient presents for magnesium sulfate infusion. Reporting constant aching abdominal pain (rated 6/10 currently) with periods of sharp pain/cramping usually accompanied by diarrhea and/or vomiting. Stated that he usually has at least 3 loose BM per day. Magnesium sulfate infusion completed without incident. Patient educated on signs/symptoms to report to provider and when to seek emergency medical attention.

## 2025-02-19 NOTE — HISTORY OF PRESENT ILLNESS
[Yes] : Yes [6] : 6 [Denies] : Denies [No] : No [N/A] : N/A [Declined] : Declined [Informed consent documented in EHR.] : Informed consent documented in EHR. [Left upper extremity] : Left upper extremity [22g] : 22g [Start Time: ___] : Medication Start Time: [unfilled] [End Time: ___] : Medication End Time: [unfilled] [Medication Name: ___] : Medication Name: [unfilled] [Total Amount Administered: ___] : Total Amount Administered: [unfilled] [IV discontinued. Intact. No signs or symptoms of IV complications noted. Time: ___] : IV discontinued. Intact. No signs or symptoms of IV complications noted. Time: [unfilled] [Patient  instructed to seek medical attention with signs and symptoms of adverse effects] : Patient  instructed to seek medical attention with signs and symptoms of adverse effects [Patient left unit in no acute distress] : Patient left unit in no acute distress [Medications administered as ordered and tolerated well.] : Medications administered as ordered and tolerated well. [de-identified] : Abdomen [de-identified] : 7087 [de-identified] : Patient presents for magnesium sulfate infusion. Reporting constant aching abdominal pain (rated 6/10 currently) with periods of sharp pain/cramping usually accompanied by diarrhea and/or vomiting. Stated that he usually has at least 3 loose BM per day. Magnesium sulfate infusion completed without incident. Patient educated on signs/symptoms to report to provider and when to seek emergency medical attention.

## 2025-04-18 NOTE — PLAN
[FreeTextEntry1] : 48M PMHx HTN, essential thrombocytosis, transferred to [facility] from [facility] with decompensated EtOH cirrhosis for liver transplant evaluation. Underwent  donor orthotopic liver transplant on 24.   Readmitted 3/5-- for VRE Bacteremia, bile leak s/p stent placement, perihepatic collection s/p drain placement removed on .   ERCP with 2 stents - admitted for stent occlusion now replaced with 3 plastic biliary stents in January  # GRAFT:  Relatively stable graft function Multiple episodes of cholangitis and repeat ERCP Last in 2025 with 3 plastic biliary stent placed - plan for repeat ERCP next Monday.   # Immuno: Fk Level 10 on tacro 0.5 BID (Mon, Wed, ), Tacro 0.5 QD (Tues, Thursday). Special dosing given low metabolism to FK vs biliary stricture.   Off Myfortic for now given leukopenia.   # PPX:  Completed PPX - periodic CMV checks  #chronic diarrhea- possibly chronic pancreatitis. Sent out stool studies, but patient never completed. Symptoms also not consistent unlikely to be infectious. will need f/u with GI. - did not respond to Creon - reduced mg with no improvement.   # Pancytopenia- CMV low detectable, Parvovirus negative, pending EBV. Referral to hematology placed.  Thrombocytopenia PLT 70--> residual splenomegaly.   # CBD Stent: Continue ursodial. last ERCP in 2025 new stents placed. Repeat ERCP 3M due in 2025 Sofya Peters MD    # Left elbow pain-- most likely related to hx bone spur at the joint. He had films done with outside ortho in the past. Will defer any elective procedure until post OLT one year.   #RPP - Never completed RPP intake. Continue to deny ETOH cravings. Understands that he cannot drink any alcohol to protect the liver. Refused medications to prevent cravings. Peth has been negative since OLT. Continue to PETH periodically..   #Hypomagnesemia - Currently on supplemental Mg 800mg BID. Poor PO intake at baseline. restart since no improvement with diarrhea. Encouraged to incorporate foods high in magnesium to diet.   #General myalgia/neuropathy post transplant-- Cont gabapentin 300mg BID. cont PT weekly.  #essential tremor-- continue on propranolol 10mg twice daily. f/u with outside neurologist.   #Shx: currently unemployed. actively applying for jobs.    #Health maintenance Colonoscopy--CF done in 2024 non bleeding hemorrhoids and rectal varices. no polyp. next due in 7-10 years.  Chest CT (smoking hx) --done in 3/2024 unremarkable.  DEXA--will obtain after one year of transplant - order at next visit Skin check--done with outside dermatologist Maria E Tatum, DO in 2024 next due in one year.  Vaccination-- Flu received in 2024 UTD  / COVID  received in 2024 UTD   / PCV will obtain in [facility]  Will reevaluate patient symptoms after ERCP consider ID referral for night sweats, pancytopenia, GI symptoms.  RPA in 2 month [place]. Repeat Home draw lab in 1 month.

## 2025-04-18 NOTE — HISTORY OF PRESENT ILLNESS
[Alcoholic Liver Disease] : Alcoholic Liver Disease [Liver] : Liver [Donor after brain death (DBD] : Donor after brain death (DBD) [Basiliximab] : Basiliximab [Steroids] : Steroids [Positive/Negative] : Positive/Negative [TextBox_52] : CHANTALOS ID: [id number] Match ID: [id number]  [FreeTextEntry1] : Adan Nagel is a 47y/o M with past medical history significant for HTN, essential thrombocytosis and decompensated ETOH cirrhosis now s/p OLT 2/14/2024 with Simulect induction, uncomplicated post-operative course discharged home on 2/27/24,   Re-admitted  March 5-20 - presents to [facility] ED on 3/5/24 with fever, diarrhea, lethargy, found to be lethargic, hypotensive and febrile, concerning for septic shock.  Blood cultures from 3/5 & 3/7 grew vancomycin resistant Enterococcus faecium (VRE), blood cultures cleared since 3/9. - CT CAP noncon (3/5): perihepatic collection - CT AP (3/8): portacaval collection measuring 9.8 x 4.4 x 3.4 cm  - ERCP 3/8 with placement of covered metal stent, positive for bile leak.  - IR drainage of perihepatic collection with pigtail drain placement on 3/12, fluid culture positive for VRE and candida glabrata. (drain removed 4/29) - Treated with Caspofungin/Daptomycin/unasyn by transplant ID - PICC line placed on 3/18   EXPLANT Pathology:  - Established cirrhosis with steatohepatitis  - Gallbladder with minimal chronic inflammation and cholelithiasis - Two benign lymph nodes with hemosiderin - The overall findings are consistent with steatohepatitis with cirrhosis.  9/17/2024 Noted with elevated LFTs bili 1.2 AST 51 ALT 45 ALKP 152 Had two ERCPs done on 9/26/2024 with Dr. Peters for post OLT biliary stricture,. Had repeat ERCP in 10/22/2024 due to stent migration, now with 2 plastic stents placed. Right sided abd pain improved after the repeat ERCP.   Last admission in January 2025 to [facility] for cholangitis and biliary stent occlusion now with 3 plastic stents placed. completed Abx Cefpodoxime/Linezolid.   Interval Hx  patient is 1+year post transplant  Has been compliant with the immunosuppressants and visits.  Noted worsening itchiness on cholestyramine and hydroxyzine with minimal improvement. No rashes.  Urinary symptoms improved after completion of UTI treatment.  Noted chills and night sweats, some nights will sweat through 2 -3 shirts. But no fevers. occurs sporadically not consistently, per patient ongoing for months.  Planning for the right shoulder cortisol injection for pain relief.  GI sx--  Diarrhea: continues to have on-and-off diarrhea unable to identify patterns. some days will have diarrhea 4-5 times, then resolve.  Nausea/vomiting: occurs mainly when on an empty stomach in the morning. Vomits fluids. Able to tolerate food.   RPP-- have not complete intake with [facility] in [place]. but Peth has been negative.   Current Immuno:  Tacro level 10.5 (not true) on tacro 0.5 BID (Mon, Wed, Friday Sunday), Tacro 0.5 QD (Tues, Thursday).

## 2025-04-18 NOTE — HISTORY OF PRESENT ILLNESS
[Alcoholic Liver Disease] : Alcoholic Liver Disease [Liver] : Liver [Donor after brain death (DBD] : Donor after brain death (DBD) [Basiliximab] : Basiliximab [Steroids] : Steroids [Positive/Negative] : Positive/Negative [TextBox_52] : CHANTALOS ID: [id number] Match ID: [id number]  [FreeTextEntry1] : Adan Nagel is a 47y/o M with past medical history significant for HTN, essential thrombocytosis and decompensated ETOH cirrhosis now s/p OLT 2/14/2024 with Simulect induction, uncomplicated post-operative course discharged home on 2/27/24,   Re-admitted  March 5-20 - presents to [facility] ED on 3/5/24 with fever, diarrhea, lethargy, found to be lethargic, hypotensive and febrile, concerning for septic shock.  Blood cultures from 3/5 & 3/7 grew vancomycin resistant Enterococcus faecium (VRE), blood cultures cleared since 3/9. - CT CAP noncon (3/5): perihepatic collection - CT AP (3/8): portacaval collection measuring 9.8 x 4.4 x 3.4 cm  - ERCP 3/8 with placement of covered metal stent, positive for bile leak.  - IR drainage of perihepatic collection with pigtail drain placement on 3/12, fluid culture positive for VRE and candida glabrata. (drain removed 4/29) - Treated with Caspofungin/Daptomycin/unasyn by transplant ID - PICC line placed on 3/18   EXPLANT Pathology:  - Established cirrhosis with steatohepatitis  - Gallbladder with minimal chronic inflammation and cholelithiasis - Two benign lymph nodes with hemosiderin - The overall findings are consistent with steatohepatitis with cirrhosis.  9/17/2024 Noted with elevated LFTs bili 1.2 AST 51 ALT 45 ALKP 152 Had two ERCPs done on 9/26/2024 with Dr. Peters for post OLT biliary stricture,. Had repeat ERCP in 10/22/2024 due to stent migration, now with 2 plastic stents placed. Right sided abd pain improved after the repeat ERCP.   Last admission in January 2025 to [facility] for cholangitis and biliary stent occlusion now with 3 plastic stents placed. completed Abx Cefpodoxime/Linezolid.   Interval Hx  patient is 1+year post transplant  Has been compliant with the immunosuppressants and visits.  Noted worsening itchiness on cholestyramine and hydroxyzine with minimal improvement. No rashes.  Urinary symptoms improved after completion of UTI treatment.  Noted chills and night sweats, some nights will sweat through 2 -3 shirts. But no fevers. occurs sporadically not consistently, per patient ongoing for months.  Planning for the right shoulder cortisol injection for pain relief.  GI sx--  Diarrhea: continues to have on-and-off diarrhea unable to identify patterns. some days will have diarrhea 4-5 times, then resolve.  Nausea/vomiting: occurs mainly when on an empty stomach in the morning. Vomits fluids. Able to tolerate food.   RPP-- have not complete intake with [facility] in [place]. but Peth has been negative.   Current Immuno:  Tacro level 10.5 (not true) on tacro 0.5 BID (Mon, Wed, Friday Sunday), Tacro 0.5 QD (Tues, Thursday).

## 2025-04-18 NOTE — PHYSICAL EXAM
[Alert] : alert [No Acute Distress] : no acute distress [EOMI] : extra ocular movement intact [PERRLA] : pupils equal, round, reactive to light and accomodation [Full ROM] : full range of motion [No Lymphadenopathy] : no lymphadenopathy [Clear to Auscultation] : lungs were clear to auscultation bilaterally [Breathing Comfortably on room air] : breathing comfortably on room air [Normal Rate] : normal rate [Regular Rhythm] : regular rhythm [Soft] : soft [Normal Bowel Sounds] : normal bowel sounds [Clean] : clean [Dry] : dry [Healing Well] : healing well [No Edema] : no edema [No Skin Discoloration] : no skin discoloration [No Ulcers] : no ulcers [Strength in Tact] : strength in tact [No Rash] : no rash [Scleral Icterus] : no scleral icterus [Hepatojugular Reflux] : no hepatojugular reflux [Ascites Fluid Wave] : no ascites fluid wave [Abdominal Ascites] : no abdominal ascites [Ascites Tense] : no ascites tense [Bleeding] : no active bleeding [Foul Odor] : no foul smell [Purulent Drainage] : no purulent drainage [Serosanguinous Drainage] : no serosanguinous drainage [Erythema] : not erythematous [Warm] : not warm [Tender] : not tender [Spider Angioma] : no spider angioma [Jaundice] : no jaundice [Palmar Erythema] : no palmar erythema [Asterixis] : no asterixis [Hepatic Encephalopathy] : no hepatic encephalopathy [de-identified] : RLQ pain around the incision line. sensitive to touch. [de-identified] : dry/intact [FreeTextEntry1] : c/o tenderness Left elbow

## 2025-04-18 NOTE — PLAN
[FreeTextEntry1] : 48M PMHx HTN, essential thrombocytosis, transferred to [facility] from [facility] with decompensated EtOH cirrhosis for liver transplant evaluation. Underwent  donor orthotopic liver transplant on 24.   Readmitted 3/5-- for VRE Bacteremia, bile leak s/p stent placement, perihepatic collection s/p drain placement removed on .   ERCP with 2 stents - admitted for stent occlusion now replaced with 3 plastic biliary stents in January  # GRAFT:  Relatively stable graft function Multiple episodes of cholangitis and repeat ERCP Last in 2025 with 3 plastic biliary stent placed - plan for repeat ERCP next Monday.   # Immuno: Fk Level 10 on tacro 0.5 BID (Mon, Wed, ), Tacro 0.5 QD (Tues, Thursday). Special dosing given low metabolism to FK vs biliary stricture.   Off Myfortic for now given leukopenia.   # PPX:  Completed PPX - periodic CMV checks  #chronic diarrhea- possibly chronic pancreatitis. Sent out stool studies, but patient never completed. Symptoms also not consistent unlikely to be infectious. will need f/u with GI. - did not respond to Creon - reduced mg with no improvement.   # Pancytopenia- CMV low detectable, Parvovirus negative, pending EBV. Referral to hematology placed.  Thrombocytopenia PLT 70--> residual splenomegaly.   # CBD Stent: Continue ursodial. last ERCP in 2025 new stents placed. Repeat ERCP 3M due in 2025 Sofya Peters MD    # Left elbow pain-- most likely related to hx bone spur at the joint. He had films done with outside ortho in the past. Will defer any elective procedure until post OLT one year.   #RPP - Never completed RPP intake. Continue to deny ETOH cravings. Understands that he cannot drink any alcohol to protect the liver. Refused medications to prevent cravings. Peth has been negative since OLT. Continue to PETH periodically..   #Hypomagnesemia - Currently on supplemental Mg 800mg BID. Poor PO intake at baseline. restart since no improvement with diarrhea. Encouraged to incorporate foods high in magnesium to diet.   #General myalgia/neuropathy post transplant-- Cont gabapentin 300mg BID. cont PT weekly.  #essential tremor-- continue on propranolol 10mg twice daily. f/u with outside neurologist.   #Shx: currently unemployed. actively applying for jobs.    #Health maintenance Colonoscopy--CF done in 2024 non bleeding hemorrhoids and rectal varices. no polyp. next due in 7-10 years.  Chest CT (smoking hx) --done in 3/2024 unremarkable.  DEXA--will obtain after one year of transplant - order at next visit Skin check--done with outside dermatologist Maria E Tatum, DO in 2024 next due in one year.  Vaccination-- Flu received in 2024 UTD  / COVID  received in 2024 UTD   / PCV will obtain in [facility]  Will reevaluate patient symptoms after ERCP consider ID referral for night sweats, pancytopenia, GI symptoms.  RPA in 2 month [place]. Repeat Home draw lab in 1 month.

## 2025-04-18 NOTE — PHYSICAL EXAM
[Alert] : alert [No Acute Distress] : no acute distress [EOMI] : extra ocular movement intact [PERRLA] : pupils equal, round, reactive to light and accomodation [Full ROM] : full range of motion [No Lymphadenopathy] : no lymphadenopathy [Clear to Auscultation] : lungs were clear to auscultation bilaterally [Breathing Comfortably on room air] : breathing comfortably on room air [Normal Rate] : normal rate [Regular Rhythm] : regular rhythm [Soft] : soft [Normal Bowel Sounds] : normal bowel sounds [Clean] : clean [Dry] : dry [Healing Well] : healing well [No Edema] : no edema [No Skin Discoloration] : no skin discoloration [No Ulcers] : no ulcers [Strength in Tact] : strength in tact [No Rash] : no rash [Scleral Icterus] : no scleral icterus [Hepatojugular Reflux] : no hepatojugular reflux [Ascites Fluid Wave] : no ascites fluid wave [Abdominal Ascites] : no abdominal ascites [Ascites Tense] : no ascites tense [Bleeding] : no active bleeding [Foul Odor] : no foul smell [Purulent Drainage] : no purulent drainage [Serosanguinous Drainage] : no serosanguinous drainage [Erythema] : not erythematous [Warm] : not warm [Tender] : not tender [Spider Angioma] : no spider angioma [Jaundice] : no jaundice [Palmar Erythema] : no palmar erythema [Asterixis] : no asterixis [Hepatic Encephalopathy] : no hepatic encephalopathy [de-identified] : RLQ pain around the incision line. sensitive to touch. [de-identified] : dry/intact [FreeTextEntry1] : c/o tenderness Left elbow

## 2025-05-06 NOTE — PLAN
No [FreeTextEntry1] : 47M PMHx HTN, essential thrombocytosis, transferred to [facility] from [facility] with decompensated EtOH cirrhosis for liver transplant evaluation. Underwent  donor orthotopic liver transplant on 24.   Readmitted 3/5-20- for VRE Bacteremia, bile leak s/p stent placement, perihepatic collection s/p drain placement removed on .   # GRAFT: recent labs done in 2024 noted with elevated liver enzymes AST 50 ALT 50 ALKP 141 TB 1.3. creatinine 0.79 Possibly related to biliary issues with elevated Tbili and given recent biliary stent removal.  Will need urgent MRCP and possibly ERCP for further evualation.  Consider liver biopsy if negative MRCP or persisting elevated liver enzymes.  stable H/H. PLT 70--> possibly related to residual splenomegaly.  Repeat labs in 1 week.  Discussed if noted alarming sx such as fevers, nausea, vomiting, jaundice, abdominal pain, itchiness need to go to [facility] ED ASAP.   # Immuno: Fk Level 6.9 on tacro 0.5 BID (Mon, Wed, ), Tacro 0.5 QD (Tues, Thursday). Special dosing given low metabolism to FK.  mg BID. Patient is compliant with medications.   # PPX: Valcyte (high risk) noted low viremia will continue for now, Bactrim (Okay to stop at 6 months tom), PPI. Stop Keppra for seizure pphx today (no hx of seizures).   # CBD Stent  ERCP w/ stent placement done during hospitalization 3/8, Repeat ERCP done on 2024 then in 2024 with stent removal by Dr. Rocha. No stents in place. continue on ursodial.   #Left elbow pain-- most likely related to hx bone spur at the joint. He had films done with outside ortho in the past. Scheduled for outpatient procedure in 2 weeks spoke to ortho office to hold off on the procedure given recent elevated liver enzymes will evaluate this first to r/o biliary obstruction vs ACR.   #RPP - patient states that he might have had the intake done with [employer] few months ago at [facility]. No further visits done. Continue to deny ETOH cravings. Understands that he cannot drink any alcohol to protect the liver. Discussed to reconnect with [facility] at [place] location today after the visit. Would try to have patient on PO Naltrexone if patient willing. Continue to PETH periodically. Social work to follow.   #Hypomagnesemia - Currently on supplemental Mg 800mg BID. Poor PO intake at baseline. Encouraged to incorporate foods high in magnesium to diet.   #Leukopenia - improved.   #General myalgia/neuropathy post transplant-- Cont gabapentin 300mg BID. cont PT weekly.  #essential tremor-- continue on propranolol 10mg twice daily. f/u with outside neurologist.    #Health maintenance EGD-- Colonoscopy-- DEXA-- Skin check--2024 with Maria E Tatum, DO Vaccination-- Flu Duane Reade UTD / COVID UTD / PCV 20 need with pharmacy.   RPA in 6 weeks [place]. Repeat Home draw lab in 1 weeks.

## 2025-05-15 NOTE — HISTORY OF PRESENT ILLNESS
[Alcoholic Liver Disease] : Alcoholic Liver Disease [Liver] : Liver [Donor after brain death (DBD] : Donor after brain death (DBD) [Basiliximab] : Basiliximab [Steroids] : Steroids [Positive/Negative] : Positive/Negative [TextBox_52] : CHANTALOS ID: [id number] Match ID: [id number]  [FreeTextEntry1] : Adan Nagel is a 49y/o M with past medical history significant for HTN, essential thrombocytosis and decompensated ETOH cirrhosis now s/p OLT 2/14/2024 with Simulect induction, uncomplicated post-operative course discharged home on 2/27/24,   Re-admitted  March 5-20 - presents to [facility] ED on 3/5/24 with fever, diarrhea, lethargy, found to be lethargic, hypotensive and febrile, concerning for septic shock.  Blood cultures from 3/5 & 3/7 grew vancomycin resistant Enterococcus faecium (VRE), blood cultures cleared since 3/9. - CT CAP noncon (3/5): perihepatic collection - CT AP (3/8): portacaval collection measuring 9.8 x 4.4 x 3.4 cm  - ERCP 3/8 with placement of covered metal stent, positive for bile leak.  - IR drainage of perihepatic collection with pigtail drain placement on 3/12, fluid culture positive for VRE and candida glabrata. (drain removed 4/29) - Treated with Caspofungin/Daptomycin/unasyn by transplant ID - PICC line placed on 3/18   EXPLANT Pathology:  - Established cirrhosis with steatohepatitis  - Gallbladder with minimal chronic inflammation and cholelithiasis - Two benign lymph nodes with hemosiderin - The overall findings are consistent with steatohepatitis with cirrhosis.  9/17/2024 Noted with elevated LFTs bili 1.2 AST 51 ALT 45 ALKP 152 Had two ERCPs done on 9/26/2024 with Dr. Peters for post OLT biliary stricture,. Had repeat ERCP in 10/22/2024 due to stent migration, now with 2 plastic stents placed. Right sided abd pain improved after the repeat ERCP.   Last admission in January 2025 to [facility] for cholangitis and biliary stent occlusion now with 3 plastic stents placed. completed Abx Cefpodoxime/Linezolid.   Interval Hx  patient is 1+year post transplant  Has been compliant with the immunosuppressants and visits.  Had ERCP done in 4/21/2025 3 stents removed. No new stents placed.   Urinary symptoms improved after completion of UTI treatment and tamsulosin use now.  Continue to have chills and night sweats, some nights will sweat through 3-5 shirts. But no fevers. ongoing for months now.  GI sx--  Reports continue to have nausea, vomiting symptoms mostly occur fasting and if experiencing abdominal pain from defecation Does occur always after eating. Vomits food. Constipation followed by diarrhea? -- reports having bowel movements daily, soft but incomplete, uses the bathroom 3-4 times at a time and straineous in the end causes abdominal pain which triggers vomiting. NO bleeding.   RPP-- have not complete intake with [facility] in [place]. but Peth has been negative.   Current Immuno:  Tacro level 5.4 on tacro 0.5 BID (Mon, Wed, Friday Sunday), Tacro 0.5 QD (Tues, Thursday).   Off myfotic for leukopenia.  Off pred.

## 2025-05-15 NOTE — PLAN
[FreeTextEntry1] : 48M PMHx HTN, essential thrombocytosis, transferred to [facility] from [facility] with decompensated EtOH cirrhosis for liver transplant evaluation. Underwent  donor orthotopic liver transplant on 24.   Readmitted 3/5-20- for VRE Bacteremia, bile leak s/p stent placement, perihepatic collection s/p drain placement removed on .   # GRAFT:  Relatively stable graft function Multiple episodes of cholangitis and repeat ERCP Last in 2025 with 3 plastic biliary stent removed. Continue follow up signs and symptoms of cholangitis and lab work.   # Immuno: Fk Level 5.4 on tacro 0.5 BID (Mon, Wed, ), Tacro 0.5 QD (Tues, Thursday). Special dosing given low metabolism to FK vs biliary stricture.   Off Myfortic for now given leukopenia.   # PPX:  Completed PPX - periodic CMV checks. Last done in 2025 with low detectable CMV level. pending new results.   #GI sx-- possibly chronic pancreatitis vs IBS-D. Sent out stool studies, but patient never completed. Symptoms also not consistent unlikely to be infectious. will need f/u with GI. - did not respond to Creon - reduced mg with no improvement.   #Nausea/vomiting-- triggered by not eating, abdominal pain when having bowel movementa. Vomits food. may benefit from further testing with a gastric emptying test.   # Pancytopenia- CMV low detectable, Parvovirus negative. Referral to hematology placed patient will call to schedule appointment.  Thrombocytopenia PLT 70--> residual splenomegaly.   # CBD Stent: Continue ursodial. last ERCP in 2025 3 stents removed. No stent replaced. Continue trend labs.   #Right shoulder pain-- Received shots for the right shoulder tendon. May need surgery in the future patient prefer to delay for later the year. continue f/u with ortho.   # Left elbow pain-- most likely related to hx bone spur at the joint. Not bothersome for now. continue f/u with ortho.   #RPP - Never completed RPP intake. Continue to deny ETOH cravings. Understands that he cannot drink any alcohol to protect the liver. Refused medications to prevent cravings. Peth has been negative since OLT. Continue to PETH periodically.   #Hypomagnesemia - Currently on supplemental Mg 800mg BID. Poor PO intake at baseline. stable for now.   #General myalgia/neuropathy post transplant-- Cont gabapentin 300mg BID. cont PT weekly.  #essential tremor-- continue on propranolol 10mg twice daily. f/u with outside neurologist.   #Night sweats-- Had indeterminate Quant pre transplant. pending repeat Quant results possible latent TB. Recent CXR negative done in 2025. Consider ID referral.   #Shx: currently unemployed. actively applying for jobs.    #Health maintenance Colonoscopy--CF done in 2024 non bleeding hemorrhoids and rectal varices. no polyp. next due in 7-10 years.  Chest CT (smoking hx) --done in 3/2024 unremarkable. Due order placed.  DEXA--will obtain after one year of transplant - order at next visit.  Skin check--done with outside dermatologist Maria E Tatum, DO in 2024 Due for derm check up patient will follow up.  Vaccination-- Flu received in 2024 UTD  / COVID  received in 2024 UTD   / PCV will obtain in [facility]  Consider ID referral for night sweats, pancytopenia, GI symptoms. Awaiting Quantiferon RPA in 2 month [place]. Repeat Home draw lab in 1 month.

## 2025-05-15 NOTE — PHYSICAL EXAM
[Alert] : alert [No Acute Distress] : no acute distress [EOMI] : extra ocular movement intact [PERRLA] : pupils equal, round, reactive to light and accomodation [Full ROM] : full range of motion [No Lymphadenopathy] : no lymphadenopathy [Clear to Auscultation] : lungs were clear to auscultation bilaterally [Breathing Comfortably on room air] : breathing comfortably on room air [Normal Rate] : normal rate [Regular Rhythm] : regular rhythm [Soft] : soft [Normal Bowel Sounds] : normal bowel sounds [Clean] : clean [Dry] : dry [Healing Well] : healing well [No Edema] : no edema [No Skin Discoloration] : no skin discoloration [No Ulcers] : no ulcers [Strength in Tact] : strength in tact [No Rash] : no rash [Scleral Icterus] : no scleral icterus [Hepatojugular Reflux] : no hepatojugular reflux [Ascites Fluid Wave] : no ascites fluid wave [Abdominal Ascites] : no abdominal ascites [Ascites Tense] : no ascites tense [Bleeding] : no active bleeding [Foul Odor] : no foul smell [Purulent Drainage] : no purulent drainage [Serosanguinous Drainage] : no serosanguinous drainage [Erythema] : not erythematous [Warm] : not warm [Tender] : not tender [Spider Angioma] : no spider angioma [Jaundice] : no jaundice [Palmar Erythema] : no palmar erythema [Asterixis] : no asterixis [Hepatic Encephalopathy] : no hepatic encephalopathy [de-identified] : RLQ pain around the incision line. sensitive to touch. [de-identified] : dry/intact [FreeTextEntry1] : c/o tenderness Left elbow

## 2025-05-20 NOTE — HISTORY OF PRESENT ILLNESS
[FreeTextEntry1] : s/p OLT 2/14/24 with good liver function now c/o pain from suture poking through skin by incision. No drainage, fevers, or bulge at the area. LFT's last week normal

## 2025-05-20 NOTE — PLAN
[FreeTextEntry1] : No change in medications or immunosuppression f/u with Dr Alexandra as scheduled Keep wound dry  remove dressing in 3 days wound check in 2 weeks

## 2025-05-20 NOTE — PHYSICAL EXAM
[No Acute Distress] : no acute distress [Scleral Icterus] : no scleral icterus [Breathing Comfortably on room air] : breathing comfortably on room air [No Edema] : no edema [de-identified] : soft, nondistended, tender at palpable suture on right side of incision and in midline. no hernia laterally along scar. +small hernia in subxiphoid area. [FreeTextEntry1] : PROCEDURE: REMOVAL SUTURES Skin prepped with betadine. 1% lidocaine injected into skin. 2 small incisions made over suture using 11-blade scalpel. Prolene sutures identified and cut below the knots and removed. Subcuticular tissue approximated with interrupted 3-0 vicryl sutures. Benzoin and steristrips applied, followed by gauze and tegaderm dressing.  Patient tolerated procedure well.

## 2025-07-19 NOTE — PLAN
[FreeTextEntry1] : 48M PMHx HTN, essential thrombocytosis, transferred to [facility] from [facility] with decompensated EtOH cirrhosis for liver transplant evaluation. Underwent  donor orthotopic liver transplant on 24.   Readmitted 3/5-20- for VRE Bacteremia, bile leak s/p stent placement, perihepatic collection s/p drain placement removed on .   ERCP with 2 stents - admitted for stent occlusion now replaced with 3 plastic biliary stents in January  # GRAFT:  Relatively stable graft function Multiple episodes of cholangitis and repeat ERCP Last in ERCP done in 2025 stent removed.   # Immuno: Fk Level 5.9 on tacro 0.5 BID (Mon, Wed, Friday, ), Tacro 0.5 QD (Tues, Thursday). Special dosing given low metabolism to FK vs biliary stricture.   Off Myfortic given pancytopenia.   # PPX:  Completed PPX - periodic CMV checks  #IBS-M-- trial miralax when constipated. Need follow up with GI .  #Small right pleural effusion? -- stable. unclear cause. Denies dyspnea.   # Pancytopenia also with night sweats-Thrombocytopenia PLT 70--> residual splenomegaly. W/u CMV low detectable, Parvovirus negative, EBV PCR negative. Indeterminate quant prior to transplant. Referral to hematology and ID placed.   # CBD Stent: continue on ursodiol. Stent removed on last ERCP in 2025. Continue monitor graft function.   # Left elbow pain-- most likely related to hx bone spur at the joint. He had films done with outside ortho in the past. Will defer any elective procedure until post OLT one year.   #RPP - Never completed RPP intake. Continue to deny ETOH cravings. Understands that he cannot drink any alcohol to protect the liver. Refused medications to prevent cravings. Peth has been negative since OLT. Continue to PETH periodically..   #Hypomagnesemia - Currently on supplemental Mg 800mg BID. Poor PO intake at baseline. restart since no improvement with diarrhea. Encouraged to incorporate foods high in magnesium to diet.   #General myalgia/neuropathy post transplant-- Cont gabapentin 300mg BID. cont PT weekly.  #essential tremor-- continue on propranolol 10mg twice daily. f/u with outside neurologist.   #Smoking-- discussed smoking cessation.    #Health maintenance Colonoscopy--CF done in 2024 non bleeding hemorrhoids and rectal varices. no polyp. next due in 7-10 years.  Chest CT (smoking hx) --done in 3/2024 unremarkable.  DEXA--order today.  Skin check--done with outside dermatologist Maria E Tatum, DO in 2024 next due in one year.  Vaccination-- Flu received in 2024 UTD  / COVID  received in 2024 UTD   / PCV will obtain in [facility]  ID and hematology referral for night sweats, pancytopenia. Repeat Home draw lab in 1 month.  RPA in 2 months

## 2025-07-19 NOTE — PLAN
[FreeTextEntry1] : 48M PMHx HTN, essential thrombocytosis, transferred to [facility] from [facility] with decompensated EtOH cirrhosis for liver transplant evaluation. Underwent  donor orthotopic liver transplant on 24.   Readmitted 3/5-20- for VRE Bacteremia, bile leak s/p stent placement, perihepatic collection s/p drain placement removed on .   ERCP with 2 stents - admitted for stent occlusion now replaced with 3 plastic biliary stents in January  # GRAFT:  Relatively stable graft function Multiple episodes of cholangitis and repeat ERCP Last in ERCP done in 2025 stent removed.   # Immuno: Fk Level 5.9 on tacro 0.5 BID (Mon, Wed, Friday, ), Tacro 0.5 QD (Tues, Thursday). Special dosing given low metabolism to FK vs biliary stricture.   Off Myfortic given pancytopenia.   # PPX:  Completed PPX - periodic CMV checks  #IBS-M-- trial miralax when constipated. Need follow up with GI .  #Small right pleural effusion? -- stable. unclear cause. Denies dyspnea.   # Pancytopenia also with night sweats-Thrombocytopenia PLT 70--> residual splenomegaly. W/u CMV low detectable, Parvovirus negative, EBV PCR negative. Indeterminate quant prior to transplant. Referral to hematology and ID placed.   # CBD Stent: continue on ursodiol. Stent removed on last ERCP in 2025. Continue monitor graft function.   # Left elbow pain-- most likely related to hx bone spur at the joint. He had films done with outside ortho in the past. Will defer any elective procedure until post OLT one year.   #RPP - Never completed RPP intake. Continue to deny ETOH cravings. Understands that he cannot drink any alcohol to protect the liver. Refused medications to prevent cravings. Peth has been negative since OLT. Continue to PETH periodically..   #Hypomagnesemia - Currently on supplemental Mg 800mg BID. Poor PO intake at baseline. restart since no improvement with diarrhea. Encouraged to incorporate foods high in magnesium to diet.   #General myalgia/neuropathy post transplant-- Cont gabapentin 300mg BID. cont PT weekly.  #essential tremor-- continue on propranolol 10mg twice daily. f/u with outside neurologist.   #Smoking-- discussed smoking cessation.    #Health maintenance Colonoscopy--CF done in 2024 non bleeding hemorrhoids and rectal varices. no polyp. next due in 7-10 years.  Chest CT (smoking hx) --done in 3/2024 unremarkable.  DEXA--order today.  Skin check--done with outside dermatologist Maria E Tatum, DO in 2024 next due in one year.  Vaccination-- Flu received in 2024 UTD  / COVID  received in 2024 UTD   / PCV will obtain in [facility]  ID and hematology referral for night sweats, pancytopenia. Repeat Home draw lab in 1 month.  RPA in 2 months

## 2025-07-19 NOTE — HISTORY OF PRESENT ILLNESS
[Alcoholic Liver Disease] : Alcoholic Liver Disease [Liver] : Liver [Donor after brain death (DBD] : Donor after brain death (DBD) [Basiliximab] : Basiliximab [Steroids] : Steroids [Positive/Negative] : Positive/Negative [TextBox_52] : CHANTALOS ID: [id number] Match ID: [id number]  [FreeTextEntry1] : Adan Nagel is a 47y/o M with past medical history significant for HTN, essential thrombocytosis and decompensated ETOH cirrhosis now s/p OLT 2/14/2024 with Simulect induction, uncomplicated post-operative course discharged home on 2/27/24,   Re-admitted  March 5-20 - presents to [facility] ED on 3/5/24 with fever, diarrhea, lethargy, found to be lethargic, hypotensive and febrile, concerning for septic shock.  Blood cultures from 3/5 & 3/7 grew vancomycin resistant Enterococcus faecium (VRE), blood cultures cleared since 3/9. - CT CAP noncon (3/5): perihepatic collection - CT AP (3/8): portacaval collection measuring 9.8 x 4.4 x 3.4 cm  - ERCP 3/8 with placement of covered metal stent, positive for bile leak.  - IR drainage of perihepatic collection with pigtail drain placement on 3/12, fluid culture positive for VRE and candida glabrata. (drain removed 4/29) - Treated with Caspofungin/Daptomycin/unasyn by transplant ID - PICC line placed on 3/18   EXPLANT Pathology:  - Established cirrhosis with steatohepatitis  - Gallbladder with minimal chronic inflammation and cholelithiasis - Two benign lymph nodes with hemosiderin - The overall findings are consistent with steatohepatitis with cirrhosis.  9/17/2024 Noted with elevated LFTs bili 1.2 AST 51 ALT 45 ALKP 152 Had two ERCPs done on 9/26/2024 with Dr. Peters for post OLT biliary stricture,. Had repeat ERCP in 10/22/2024 due to stent migration, now with 2 plastic stents placed. Right sided abd pain improved after the repeat ERCP.   Last admission in January 2025 to [facility] for cholangitis and biliary stent occlusion now with 3 plastic stents placed. completed Abx Cefpodoxime/Linezolid.   Interval Hx  patient is 1.5+year post transplant  Has been compliant with the immunosuppressants and visits.  Continue to experience night sweats, heavy amount that sweats through 2-3 shirts. No fevers. No URI symptoms.  Started smoking 3 cigs a day again. Partner also smoking.  GI sx--  Noticed a pattern to the vomiting always occurs when he is constipated. Reports constipation leading to abdominal pain followed by vomiting--- IBS.   RPP-- have not complete intake with [facility] in [place]. but Peth has been negative.   Current Immuno:  Tacro level 5.9 (not true) on tacro 0.5 BID (Mon, Wed, Friday Sunday), Tacro 0.5 QD (Tues, Thursday).

## 2025-07-19 NOTE — HISTORY OF PRESENT ILLNESS
[Alcoholic Liver Disease] : Alcoholic Liver Disease [Liver] : Liver [Donor after brain death (DBD] : Donor after brain death (DBD) [Basiliximab] : Basiliximab [Steroids] : Steroids [Positive/Negative] : Positive/Negative [TextBox_52] : CHANTALOS ID: [id number] Match ID: [id number]  [FreeTextEntry1] : Adan Nagel is a 49y/o M with past medical history significant for HTN, essential thrombocytosis and decompensated ETOH cirrhosis now s/p OLT 2/14/2024 with Simulect induction, uncomplicated post-operative course discharged home on 2/27/24,   Re-admitted  March 5-20 - presents to [facility] ED on 3/5/24 with fever, diarrhea, lethargy, found to be lethargic, hypotensive and febrile, concerning for septic shock.  Blood cultures from 3/5 & 3/7 grew vancomycin resistant Enterococcus faecium (VRE), blood cultures cleared since 3/9. - CT CAP noncon (3/5): perihepatic collection - CT AP (3/8): portacaval collection measuring 9.8 x 4.4 x 3.4 cm  - ERCP 3/8 with placement of covered metal stent, positive for bile leak.  - IR drainage of perihepatic collection with pigtail drain placement on 3/12, fluid culture positive for VRE and candida glabrata. (drain removed 4/29) - Treated with Caspofungin/Daptomycin/unasyn by transplant ID - PICC line placed on 3/18   EXPLANT Pathology:  - Established cirrhosis with steatohepatitis  - Gallbladder with minimal chronic inflammation and cholelithiasis - Two benign lymph nodes with hemosiderin - The overall findings are consistent with steatohepatitis with cirrhosis.  9/17/2024 Noted with elevated LFTs bili 1.2 AST 51 ALT 45 ALKP 152 Had two ERCPs done on 9/26/2024 with Dr. Peters for post OLT biliary stricture,. Had repeat ERCP in 10/22/2024 due to stent migration, now with 2 plastic stents placed. Right sided abd pain improved after the repeat ERCP.   Last admission in January 2025 to [facility] for cholangitis and biliary stent occlusion now with 3 plastic stents placed. completed Abx Cefpodoxime/Linezolid.   Interval Hx  patient is 1.5+year post transplant  Has been compliant with the immunosuppressants and visits.  Continue to experience night sweats, heavy amount that sweats through 2-3 shirts. No fevers. No URI symptoms.  Started smoking 3 cigs a day again. Partner also smoking.  GI sx--  Noticed a pattern to the vomiting always occurs when he is constipated. Reports constipation leading to abdominal pain followed by vomiting--- IBS.   RPP-- have not complete intake with [facility] in [place]. but Peth has been negative.   Current Immuno:  Tacro level 5.9 (not true) on tacro 0.5 BID (Mon, Wed, Friday Sunday), Tacro 0.5 QD (Tues, Thursday).

## 2025-07-19 NOTE — PHYSICAL EXAM
[Alert] : alert [No Acute Distress] : no acute distress [EOMI] : extra ocular movement intact [PERRLA] : pupils equal, round, reactive to light and accomodation [Full ROM] : full range of motion [No Lymphadenopathy] : no lymphadenopathy [Clear to Auscultation] : lungs were clear to auscultation bilaterally [Breathing Comfortably on room air] : breathing comfortably on room air [Normal Rate] : normal rate [Regular Rhythm] : regular rhythm [Soft] : soft [Normal Bowel Sounds] : normal bowel sounds [Clean] : clean [Dry] : dry [Healing Well] : healing well [No Edema] : no edema [No Skin Discoloration] : no skin discoloration [No Ulcers] : no ulcers [Strength in Tact] : strength in tact [No Rash] : no rash [Scleral Icterus] : no scleral icterus [Hepatojugular Reflux] : no hepatojugular reflux [Ascites Fluid Wave] : no ascites fluid wave [Abdominal Ascites] : no abdominal ascites [Ascites Tense] : no ascites tense [Bleeding] : no active bleeding [Foul Odor] : no foul smell [Purulent Drainage] : no purulent drainage [Serosanguinous Drainage] : no serosanguinous drainage [Erythema] : not erythematous [Warm] : not warm [Tender] : not tender [Spider Angioma] : no spider angioma [Jaundice] : no jaundice [Palmar Erythema] : no palmar erythema [Asterixis] : no asterixis [Hepatic Encephalopathy] : no hepatic encephalopathy [de-identified] : RLQ pain around the incision line. sensitive to touch. [de-identified] : dry/intact [FreeTextEntry1] : c/o tenderness Left elbow

## 2025-07-19 NOTE — HISTORY OF PRESENT ILLNESS
[Alcoholic Liver Disease] : Alcoholic Liver Disease [Liver] : Liver [Donor after brain death (DBD] : Donor after brain death (DBD) [Basiliximab] : Basiliximab [Steroids] : Steroids [Positive/Negative] : Positive/Negative [TextBox_52] : CHANTALOS ID: [id number] Match ID: [id number]  [FreeTextEntry1] : Adan Nagel is a 49y/o M with past medical history significant for HTN, essential thrombocytosis and decompensated ETOH cirrhosis now s/p OLT 2/14/2024 with Simulect induction, uncomplicated post-operative course discharged home on 2/27/24,   Re-admitted  March 5-20 - presents to [facility] ED on 3/5/24 with fever, diarrhea, lethargy, found to be lethargic, hypotensive and febrile, concerning for septic shock.  Blood cultures from 3/5 & 3/7 grew vancomycin resistant Enterococcus faecium (VRE), blood cultures cleared since 3/9. - CT CAP noncon (3/5): perihepatic collection - CT AP (3/8): portacaval collection measuring 9.8 x 4.4 x 3.4 cm  - ERCP 3/8 with placement of covered metal stent, positive for bile leak.  - IR drainage of perihepatic collection with pigtail drain placement on 3/12, fluid culture positive for VRE and candida glabrata. (drain removed 4/29) - Treated with Caspofungin/Daptomycin/unasyn by transplant ID - PICC line placed on 3/18   EXPLANT Pathology:  - Established cirrhosis with steatohepatitis  - Gallbladder with minimal chronic inflammation and cholelithiasis - Two benign lymph nodes with hemosiderin - The overall findings are consistent with steatohepatitis with cirrhosis.  9/17/2024 Noted with elevated LFTs bili 1.2 AST 51 ALT 45 ALKP 152 Had two ERCPs done on 9/26/2024 with Dr. Peters for post OLT biliary stricture,. Had repeat ERCP in 10/22/2024 due to stent migration, now with 2 plastic stents placed. Right sided abd pain improved after the repeat ERCP.   Last admission in January 2025 to [facility] for cholangitis and biliary stent occlusion now with 3 plastic stents placed. completed Abx Cefpodoxime/Linezolid.   Interval Hx  patient is 1.5+year post transplant  Has been compliant with the immunosuppressants and visits.  Continue to experience night sweats, heavy amount that sweats through 2-3 shirts. No fevers. No URI symptoms.  Started smoking 3 cigs a day again. Partner also smoking.  GI sx--  Noticed a pattern to the vomiting always occurs when he is constipated. Reports constipation leading to abdominal pain followed by vomiting--- IBS.   RPP-- have not complete intake with [facility] in [place]. but Peth has been negative.   Current Immuno:  Tacro level 5.9 (not true) on tacro 0.5 BID (Mon, Wed, Friday Sunday), Tacro 0.5 QD (Tues, Thursday).

## 2025-07-19 NOTE — PHYSICAL EXAM
[Alert] : alert [No Acute Distress] : no acute distress [EOMI] : extra ocular movement intact [PERRLA] : pupils equal, round, reactive to light and accomodation [Full ROM] : full range of motion [No Lymphadenopathy] : no lymphadenopathy [Clear to Auscultation] : lungs were clear to auscultation bilaterally [Breathing Comfortably on room air] : breathing comfortably on room air [Normal Rate] : normal rate [Regular Rhythm] : regular rhythm [Soft] : soft [Normal Bowel Sounds] : normal bowel sounds [Clean] : clean [Dry] : dry [Healing Well] : healing well [No Edema] : no edema [No Skin Discoloration] : no skin discoloration [No Ulcers] : no ulcers [Strength in Tact] : strength in tact [No Rash] : no rash [Scleral Icterus] : no scleral icterus [Hepatojugular Reflux] : no hepatojugular reflux [Ascites Fluid Wave] : no ascites fluid wave [Abdominal Ascites] : no abdominal ascites [Ascites Tense] : no ascites tense [Bleeding] : no active bleeding [Foul Odor] : no foul smell [Purulent Drainage] : no purulent drainage [Serosanguinous Drainage] : no serosanguinous drainage [Erythema] : not erythematous [Warm] : not warm [Tender] : not tender [Spider Angioma] : no spider angioma [Jaundice] : no jaundice [Palmar Erythema] : no palmar erythema [Asterixis] : no asterixis [Hepatic Encephalopathy] : no hepatic encephalopathy [de-identified] : RLQ pain around the incision line. sensitive to touch. [de-identified] : dry/intact [FreeTextEntry1] : c/o tenderness Left elbow

## 2025-07-19 NOTE — PHYSICAL EXAM
[Alert] : alert [No Acute Distress] : no acute distress [EOMI] : extra ocular movement intact [PERRLA] : pupils equal, round, reactive to light and accomodation [Full ROM] : full range of motion [No Lymphadenopathy] : no lymphadenopathy [Clear to Auscultation] : lungs were clear to auscultation bilaterally [Breathing Comfortably on room air] : breathing comfortably on room air [Normal Rate] : normal rate [Regular Rhythm] : regular rhythm [Soft] : soft [Normal Bowel Sounds] : normal bowel sounds [Clean] : clean [Dry] : dry [Healing Well] : healing well [No Edema] : no edema [No Skin Discoloration] : no skin discoloration [No Ulcers] : no ulcers [Strength in Tact] : strength in tact [No Rash] : no rash [Scleral Icterus] : no scleral icterus [Hepatojugular Reflux] : no hepatojugular reflux [Ascites Fluid Wave] : no ascites fluid wave [Abdominal Ascites] : no abdominal ascites [Ascites Tense] : no ascites tense [Bleeding] : no active bleeding [Foul Odor] : no foul smell [Purulent Drainage] : no purulent drainage [Serosanguinous Drainage] : no serosanguinous drainage [Erythema] : not erythematous [Warm] : not warm [Tender] : not tender [Spider Angioma] : no spider angioma [Jaundice] : no jaundice [Palmar Erythema] : no palmar erythema [Asterixis] : no asterixis [Hepatic Encephalopathy] : no hepatic encephalopathy [de-identified] : RLQ pain around the incision line. sensitive to touch. [de-identified] : dry/intact [FreeTextEntry1] : c/o tenderness Left elbow

## 2025-07-19 NOTE — PLAN
[FreeTextEntry1] : 48M PMHx HTN, essential thrombocytosis, transferred to [facility] from [facility] with decompensated EtOH cirrhosis for liver transplant evaluation. Underwent  donor orthotopic liver transplant on 24.   Readmitted 3/5-20- for VRE Bacteremia, bile leak s/p stent placement, perihepatic collection s/p drain placement removed on .   ERCP with 2 stents - admitted for stent occlusion now replaced with 3 plastic biliary stents in January  # GRAFT:  Relatively stable graft function Multiple episodes of cholangitis and repeat ERCP Last in ERCP done in 2025 stent removed.   # Immuno: Fk Level 5.9 on tacro 0.5 BID (Mon, Wed, Friday, ), Tacro 0.5 QD (Tues, Thursday). Special dosing given low metabolism to FK vs biliary stricture.   Off Myfortic given pancytopenia.   # PPX:  Completed PPX - periodic CMV checks  #IBS-M-- trial miralax when constipated. Need follow up with GI .  #Small right pleural effusion? -- stable. unclear cause. Denies dyspnea.   # Pancytopenia also with night sweats-Thrombocytopenia PLT 70--> residual splenomegaly. W/u CMV low detectable, Parvovirus negative, EBV PCR negative. Indeterminate quant prior to transplant. Referral to hematology and ID placed.   # CBD Stent: continue on ursodiol. Stent removed on last ERCP in 2025. Continue monitor graft function.   # Left elbow pain-- most likely related to hx bone spur at the joint. He had films done with outside ortho in the past. Will defer any elective procedure until post OLT one year.   #RPP - Never completed RPP intake. Continue to deny ETOH cravings. Understands that he cannot drink any alcohol to protect the liver. Refused medications to prevent cravings. Peth has been negative since OLT. Continue to PETH periodically..   #Hypomagnesemia - Currently on supplemental Mg 800mg BID. Poor PO intake at baseline. restart since no improvement with diarrhea. Encouraged to incorporate foods high in magnesium to diet.   #General myalgia/neuropathy post transplant-- Cont gabapentin 300mg BID. cont PT weekly.  #essential tremor-- continue on propranolol 10mg twice daily. f/u with outside neurologist.   #Smoking-- discussed smoking cessation.    #Health maintenance Colonoscopy--CF done in 2024 non bleeding hemorrhoids and rectal varices. no polyp. next due in 7-10 years.  Chest CT (smoking hx) --done in 3/2024 unremarkable.  DEXA--order today.  Skin check--done with outside dermatologist Maria E Tatum, DO in 2024 next due in one year.  Vaccination-- Flu received in 2024 UTD  / COVID  received in 2024 UTD   / PCV will obtain in [facility]  ID and hematology referral for night sweats, pancytopenia. Repeat Home draw lab in 1 month.  RPA in 2 months